# Patient Record
Sex: FEMALE | Race: WHITE | NOT HISPANIC OR LATINO | Employment: FULL TIME | URBAN - NONMETROPOLITAN AREA
[De-identification: names, ages, dates, MRNs, and addresses within clinical notes are randomized per-mention and may not be internally consistent; named-entity substitution may affect disease eponyms.]

---

## 2021-09-06 ENCOUNTER — OFFICE VISIT (OUTPATIENT)
Dept: FAMILY MEDICINE | Facility: CLINIC | Age: 31
End: 2021-09-06
Payer: OTHER GOVERNMENT

## 2021-09-06 VITALS
HEART RATE: 110 BPM | BODY MASS INDEX: 23.92 KG/M2 | TEMPERATURE: 99 F | HEIGHT: 63 IN | WEIGHT: 135 LBS | OXYGEN SATURATION: 100 % | RESPIRATION RATE: 17 BRPM

## 2021-09-06 DIAGNOSIS — Z20.822 LAB TEST NEGATIVE FOR COVID-19 VIRUS: ICD-10-CM

## 2021-09-06 DIAGNOSIS — J02.9 SORE THROAT: Primary | ICD-10-CM

## 2021-09-06 DIAGNOSIS — G44.209 ACUTE NON INTRACTABLE TENSION-TYPE HEADACHE: ICD-10-CM

## 2021-09-06 PROBLEM — G43.909 MIGRAINE: Status: ACTIVE | Noted: 2020-07-24

## 2021-09-06 PROBLEM — J35.01 CHRONIC TONSILLITIS: Status: ACTIVE | Noted: 2020-07-02

## 2021-09-06 LAB
CTP QC/QA: YES
CTP QC/QA: YES
S PYO RRNA THROAT QL PROBE: NEGATIVE
SARS-COV-2 AG RESP QL IA.RAPID: NEGATIVE

## 2021-09-06 PROCEDURE — 99203 PR OFFICE/OUTPT VISIT, NEW, LEVL III, 30-44 MIN: ICD-10-PCS | Mod: ,,, | Performed by: NURSE PRACTITIONER

## 2021-09-06 PROCEDURE — 87880 POCT RAPID STREP A: ICD-10-PCS | Mod: QW,,, | Performed by: NURSE PRACTITIONER

## 2021-09-06 PROCEDURE — 99203 OFFICE O/P NEW LOW 30 MIN: CPT | Mod: ,,, | Performed by: NURSE PRACTITIONER

## 2021-09-06 PROCEDURE — 87426 SARSCOV CORONAVIRUS AG IA: CPT | Mod: QW,,, | Performed by: NURSE PRACTITIONER

## 2021-09-06 PROCEDURE — 87880 STREP A ASSAY W/OPTIC: CPT | Mod: QW,,, | Performed by: NURSE PRACTITIONER

## 2021-09-06 PROCEDURE — 87426 SARS CORONAVIRUS 2 ANTIGEN POCT: ICD-10-PCS | Mod: QW,,, | Performed by: NURSE PRACTITIONER

## 2021-09-06 RX ORDER — ESCITALOPRAM OXALATE 20 MG/1
TABLET ORAL
COMMUNITY
Start: 2020-08-03 | End: 2021-09-30

## 2021-09-06 RX ORDER — CYANOCOBALAMIN (VITAMIN B-12)
POWDER (GRAM) MISCELLANEOUS
COMMUNITY

## 2021-09-06 RX ORDER — ACETAMINOPHEN 500 MG
5000 TABLET ORAL
COMMUNITY

## 2021-09-06 RX ORDER — DEXTROAMPHETAMINE SACCHARATE, AMPHETAMINE ASPARTATE, DEXTROAMPHETAMINE SULFATE AND AMPHETAMINE SULFATE 7.5; 7.5; 7.5; 7.5 MG/1; MG/1; MG/1; MG/1
1 TABLET ORAL DAILY
COMMUNITY
Start: 2021-09-03 | End: 2021-09-30 | Stop reason: SDUPTHER

## 2021-09-06 RX ORDER — CETIRIZINE HYDROCHLORIDE, PSEUDOEPHEDRINE HYDROCHLORIDE 5; 120 MG/1; MG/1
TABLET, FILM COATED, EXTENDED RELEASE ORAL
COMMUNITY
End: 2023-03-29 | Stop reason: SDUPTHER

## 2021-09-06 RX ORDER — TOPIRAMATE 50 MG/1
TABLET, FILM COATED ORAL
COMMUNITY
Start: 2020-10-12 | End: 2023-03-29 | Stop reason: SDUPTHER

## 2021-09-30 ENCOUNTER — OFFICE VISIT (OUTPATIENT)
Dept: FAMILY MEDICINE | Facility: CLINIC | Age: 31
End: 2021-09-30
Payer: OTHER GOVERNMENT

## 2021-09-30 VITALS
TEMPERATURE: 98 F | OXYGEN SATURATION: 99 % | DIASTOLIC BLOOD PRESSURE: 85 MMHG | SYSTOLIC BLOOD PRESSURE: 132 MMHG | BODY MASS INDEX: 23.39 KG/M2 | WEIGHT: 132 LBS | RESPIRATION RATE: 18 BRPM | HEIGHT: 63 IN | HEART RATE: 93 BPM

## 2021-09-30 DIAGNOSIS — F98.8 ADULT ATTENTION DEFICIT DISORDER: Primary | ICD-10-CM

## 2021-09-30 LAB
B-HCG UR QL: NEGATIVE
CTP QC/QA: YES
CTP QC/QA: YES
POC (AMP) AMPHETAMINE: ABNORMAL
POC (BAR) BARBITURATES: NEGATIVE
POC (BUP) BUPRENORPHINE: NEGATIVE
POC (BZO) BENZODIAZEPINES: NEGATIVE
POC (COC) COCAINE: NEGATIVE
POC (MDMA) METHYLENEDIOXYMETHAMPHETAMINE 3,4: NEGATIVE
POC (MET) METHAMPHETAMINE: NEGATIVE
POC (MOP) OPIATES: NEGATIVE
POC (MTD) METHADONE: NEGATIVE
POC (OXY) OXYCODONE: NEGATIVE
POC (PCP) PHENCYCLIDINE: NEGATIVE
POC (TCA) TRICYCLIC ANTIDEPRESSANTS: NEGATIVE
POC TEMPERATURE (URINE): 92
POC THC: NEGATIVE

## 2021-09-30 PROCEDURE — 99213 OFFICE O/P EST LOW 20 MIN: CPT | Mod: ,,, | Performed by: NURSE PRACTITIONER

## 2021-09-30 PROCEDURE — 80305 POCT URINE DRUG SCREEN PRESUMP: ICD-10-PCS | Mod: QW,,, | Performed by: NURSE PRACTITIONER

## 2021-09-30 PROCEDURE — 80305 DRUG TEST PRSMV DIR OPT OBS: CPT | Mod: QW,,, | Performed by: NURSE PRACTITIONER

## 2021-09-30 PROCEDURE — 99213 PR OFFICE/OUTPT VISIT, EST, LEVL III, 20-29 MIN: ICD-10-PCS | Mod: ,,, | Performed by: NURSE PRACTITIONER

## 2021-09-30 RX ORDER — MAGNESIUM 250 MG
TABLET ORAL
COMMUNITY

## 2021-09-30 RX ORDER — ASPRIN AND CAFFEINE 400; 32 MG/1; MG/1
TABLET ORAL
COMMUNITY

## 2021-09-30 RX ORDER — PROMETHAZINE HYDROCHLORIDE 25 MG/1
TABLET ORAL
COMMUNITY
Start: 2021-04-30

## 2021-09-30 RX ORDER — DEXTROAMPHETAMINE SACCHARATE, AMPHETAMINE ASPARTATE, DEXTROAMPHETAMINE SULFATE AND AMPHETAMINE SULFATE 7.5; 7.5; 7.5; 7.5 MG/1; MG/1; MG/1; MG/1
1 TABLET ORAL DAILY
Qty: 30 TABLET | Refills: 0 | Status: SHIPPED | OUTPATIENT
Start: 2021-09-30 | End: 2021-12-29 | Stop reason: SDUPTHER

## 2021-09-30 RX ORDER — SUMATRIPTAN 50 MG/1
TABLET, FILM COATED ORAL
COMMUNITY

## 2021-09-30 RX ORDER — IRON,CARBONYL/ASCORBIC ACID 65MG-125MG
TABLET, DELAYED RELEASE (ENTERIC COATED) ORAL
COMMUNITY

## 2021-09-30 RX ORDER — PYRIDOXINE HCL (VITAMIN B6) 100 MG
TABLET ORAL
COMMUNITY

## 2021-11-23 ENCOUNTER — OFFICE VISIT (OUTPATIENT)
Dept: FAMILY MEDICINE | Facility: CLINIC | Age: 31
End: 2021-11-23
Payer: OTHER GOVERNMENT

## 2021-11-23 VITALS — TEMPERATURE: 99 F

## 2021-11-23 DIAGNOSIS — U07.1 COVID-19: Primary | ICD-10-CM

## 2021-11-23 DIAGNOSIS — Z20.822 CONTACT WITH AND (SUSPECTED) EXPOSURE TO COVID-19: ICD-10-CM

## 2021-11-23 LAB
CTP QC/QA: YES
FLUAV AG NPH QL: NEGATIVE
FLUBV AG NPH QL: NEGATIVE
SARS-COV-2 AG RESP QL IA.RAPID: POSITIVE

## 2021-11-23 PROCEDURE — 87428 SARSCOV & INF VIR A&B AG IA: CPT | Mod: QW,,, | Performed by: NURSE PRACTITIONER

## 2021-11-23 PROCEDURE — 99213 PR OFFICE/OUTPT VISIT, EST, LEVL III, 20-29 MIN: ICD-10-PCS | Mod: ,,, | Performed by: NURSE PRACTITIONER

## 2021-11-23 PROCEDURE — 87428 POCT SARS-COV2 (COVID) WITH FLU ANTIGEN: ICD-10-PCS | Mod: QW,,, | Performed by: NURSE PRACTITIONER

## 2021-11-23 PROCEDURE — 99213 OFFICE O/P EST LOW 20 MIN: CPT | Mod: ,,, | Performed by: NURSE PRACTITIONER

## 2021-11-23 RX ORDER — AZITHROMYCIN 250 MG/1
TABLET, FILM COATED ORAL
Qty: 6 TABLET | Refills: 0 | Status: SHIPPED | OUTPATIENT
Start: 2021-11-23 | End: 2021-12-29

## 2021-11-23 RX ORDER — BENZONATATE 100 MG/1
100 CAPSULE ORAL 3 TIMES DAILY PRN
Qty: 30 CAPSULE | Refills: 0 | Status: SHIPPED | OUTPATIENT
Start: 2021-11-23 | End: 2021-12-29

## 2021-11-24 ENCOUNTER — INFUSION (OUTPATIENT)
Dept: INFECTIOUS DISEASES | Facility: HOSPITAL | Age: 31
End: 2021-11-24
Attending: NURSE PRACTITIONER
Payer: OTHER GOVERNMENT

## 2021-11-24 VITALS
RESPIRATION RATE: 16 BRPM | SYSTOLIC BLOOD PRESSURE: 117 MMHG | HEART RATE: 102 BPM | TEMPERATURE: 98 F | DIASTOLIC BLOOD PRESSURE: 74 MMHG | OXYGEN SATURATION: 99 %

## 2021-11-24 DIAGNOSIS — U07.1 COVID-19: ICD-10-CM

## 2021-11-24 PROCEDURE — 25000003 PHARM REV CODE 250: Performed by: NURSE PRACTITIONER

## 2021-11-24 PROCEDURE — M0243 CASIRIVI AND IMDEVI INFUSION: HCPCS | Performed by: NURSE PRACTITIONER

## 2021-11-24 PROCEDURE — 63600175 PHARM REV CODE 636 W HCPCS: Performed by: NURSE PRACTITIONER

## 2021-11-24 RX ORDER — SODIUM CHLORIDE 0.9 % (FLUSH) 0.9 %
10 SYRINGE (ML) INJECTION
Status: DISCONTINUED | OUTPATIENT
Start: 2021-11-24 | End: 2021-12-29

## 2021-11-24 RX ORDER — EPINEPHRINE 0.3 MG/.3ML
0.3 INJECTION SUBCUTANEOUS
Status: DISCONTINUED | OUTPATIENT
Start: 2021-11-24 | End: 2021-12-29

## 2021-11-24 RX ORDER — ALBUTEROL SULFATE 90 UG/1
2 AEROSOL, METERED RESPIRATORY (INHALATION)
Status: DISCONTINUED | OUTPATIENT
Start: 2021-11-24 | End: 2021-12-29

## 2021-11-24 RX ORDER — DIPHENHYDRAMINE HYDROCHLORIDE 50 MG/ML
25 INJECTION INTRAMUSCULAR; INTRAVENOUS ONCE AS NEEDED
Status: DISCONTINUED | OUTPATIENT
Start: 2021-11-24 | End: 2021-12-29

## 2021-11-24 RX ORDER — ACETAMINOPHEN 325 MG/1
650 TABLET ORAL ONCE AS NEEDED
Status: DISCONTINUED | OUTPATIENT
Start: 2021-11-24 | End: 2021-12-29

## 2021-11-24 RX ORDER — ONDANSETRON 4 MG/1
4 TABLET, ORALLY DISINTEGRATING ORAL ONCE AS NEEDED
Status: DISCONTINUED | OUTPATIENT
Start: 2021-11-24 | End: 2021-12-29

## 2021-11-24 RX ADMIN — CASIRIVIMAB AND IMDEVIMAB 600 MG: 600; 600 INJECTION, SOLUTION, CONCENTRATE INTRAVENOUS at 11:11

## 2021-12-29 ENCOUNTER — OFFICE VISIT (OUTPATIENT)
Dept: FAMILY MEDICINE | Facility: CLINIC | Age: 31
End: 2021-12-29
Payer: OTHER GOVERNMENT

## 2021-12-29 ENCOUNTER — HOSPITAL ENCOUNTER (OUTPATIENT)
Dept: CARDIOLOGY | Facility: HOSPITAL | Age: 31
Discharge: HOME | End: 2021-12-29
Payer: OTHER GOVERNMENT

## 2021-12-29 VITALS
HEIGHT: 63 IN | SYSTOLIC BLOOD PRESSURE: 118 MMHG | BODY MASS INDEX: 23.57 KG/M2 | HEART RATE: 96 BPM | DIASTOLIC BLOOD PRESSURE: 81 MMHG | OXYGEN SATURATION: 99 % | TEMPERATURE: 98 F | WEIGHT: 133 LBS | RESPIRATION RATE: 18 BRPM

## 2021-12-29 DIAGNOSIS — F98.8 ADULT ATTENTION DEFICIT DISORDER: Primary | ICD-10-CM

## 2021-12-29 DIAGNOSIS — J01.00 ACUTE MAXILLARY SINUSITIS, RECURRENCE NOT SPECIFIED: ICD-10-CM

## 2021-12-29 DIAGNOSIS — F98.8 ADULT ATTENTION DEFICIT DISORDER: ICD-10-CM

## 2021-12-29 DIAGNOSIS — H65.01 RIGHT ACUTE SEROUS OTITIS MEDIA, RECURRENCE NOT SPECIFIED: ICD-10-CM

## 2021-12-29 DIAGNOSIS — R94.31 ABNORMAL EKG: ICD-10-CM

## 2021-12-29 DIAGNOSIS — H92.01 ACUTE OTALGIA, RIGHT: ICD-10-CM

## 2021-12-29 PROCEDURE — 80305 DRUG TEST PRSMV DIR OPT OBS: CPT | Mod: QW,,, | Performed by: NURSE PRACTITIONER

## 2021-12-29 PROCEDURE — 93005 ELECTROCARDIOGRAM TRACING: CPT

## 2021-12-29 PROCEDURE — 93000 PR ELECTROCARDIOGRAM, COMPLETE: ICD-10-PCS | Mod: ,,, | Performed by: NURSE PRACTITIONER

## 2021-12-29 PROCEDURE — 80305 POCT URINE DRUG SCREEN PRESUMP: ICD-10-PCS | Mod: QW,,, | Performed by: NURSE PRACTITIONER

## 2021-12-29 PROCEDURE — 99213 PR OFFICE/OUTPT VISIT, EST, LEVL III, 20-29 MIN: ICD-10-PCS | Mod: 25,,, | Performed by: NURSE PRACTITIONER

## 2021-12-29 PROCEDURE — 93010 ELECTROCARDIOGRAM REPORT: CPT | Mod: ,,, | Performed by: INTERNAL MEDICINE

## 2021-12-29 PROCEDURE — 93010 EKG 12-LEAD: ICD-10-PCS | Mod: ,,, | Performed by: INTERNAL MEDICINE

## 2021-12-29 PROCEDURE — 99213 OFFICE O/P EST LOW 20 MIN: CPT | Mod: 25,,, | Performed by: NURSE PRACTITIONER

## 2021-12-29 PROCEDURE — 81025 POCT URINE PREGNANCY: ICD-10-PCS | Mod: QW,,, | Performed by: NURSE PRACTITIONER

## 2021-12-29 PROCEDURE — 81025 URINE PREGNANCY TEST: CPT | Mod: QW,,, | Performed by: NURSE PRACTITIONER

## 2021-12-29 PROCEDURE — 93000 ELECTROCARDIOGRAM COMPLETE: CPT | Mod: ,,, | Performed by: NURSE PRACTITIONER

## 2021-12-29 RX ORDER — SULFAMETHOXAZOLE AND TRIMETHOPRIM 800; 160 MG/1; MG/1
1 TABLET ORAL 2 TIMES DAILY
Qty: 14 TABLET | Refills: 0 | Status: SHIPPED | OUTPATIENT
Start: 2021-12-29 | End: 2022-01-05

## 2021-12-29 RX ORDER — DEXTROAMPHETAMINE SACCHARATE, AMPHETAMINE ASPARTATE, DEXTROAMPHETAMINE SULFATE AND AMPHETAMINE SULFATE 7.5; 7.5; 7.5; 7.5 MG/1; MG/1; MG/1; MG/1
1 TABLET ORAL DAILY
Qty: 30 TABLET | Refills: 0 | Status: SHIPPED | OUTPATIENT
Start: 2021-12-29 | End: 2022-03-29 | Stop reason: SDUPTHER

## 2021-12-29 RX ORDER — FLUTICASONE PROPIONATE 50 MCG
1 SPRAY, SUSPENSION (ML) NASAL DAILY
Qty: 16 G | Refills: 2 | Status: SHIPPED | OUTPATIENT
Start: 2021-12-29

## 2021-12-30 ENCOUNTER — PATIENT MESSAGE (OUTPATIENT)
Dept: FAMILY MEDICINE | Facility: CLINIC | Age: 31
End: 2021-12-30
Payer: OTHER GOVERNMENT

## 2022-01-05 ENCOUNTER — OFFICE VISIT (OUTPATIENT)
Dept: CARDIOLOGY | Facility: CLINIC | Age: 32
End: 2022-01-05
Payer: OTHER GOVERNMENT

## 2022-01-05 VITALS
OXYGEN SATURATION: 97 % | SYSTOLIC BLOOD PRESSURE: 116 MMHG | DIASTOLIC BLOOD PRESSURE: 84 MMHG | HEART RATE: 108 BPM | HEIGHT: 63 IN | BODY MASS INDEX: 23.39 KG/M2 | WEIGHT: 132 LBS

## 2022-01-05 DIAGNOSIS — I95.1 ORTHOSTATIC HYPOTENSION: ICD-10-CM

## 2022-01-05 DIAGNOSIS — G43.709 CHRONIC MIGRAINE WITHOUT AURA WITHOUT STATUS MIGRAINOSUS, NOT INTRACTABLE: ICD-10-CM

## 2022-01-05 DIAGNOSIS — R94.31 ABNORMAL EKG: ICD-10-CM

## 2022-01-05 DIAGNOSIS — F98.8 ADD (ATTENTION DEFICIT DISORDER) WITHOUT HYPERACTIVITY: ICD-10-CM

## 2022-01-05 DIAGNOSIS — R94.31 NONSPECIFIC ABNORMAL ELECTROCARDIOGRAM (ECG) (EKG): Primary | ICD-10-CM

## 2022-01-05 PROCEDURE — 99215 OFFICE O/P EST HI 40 MIN: CPT | Mod: PBBFAC | Performed by: INTERNAL MEDICINE

## 2022-01-05 PROCEDURE — 99205 OFFICE O/P NEW HI 60 MIN: CPT | Mod: S$PBB,,, | Performed by: INTERNAL MEDICINE

## 2022-01-05 PROCEDURE — 99205 PR OFFICE/OUTPT VISIT, NEW, LEVL V, 60-74 MIN: ICD-10-PCS | Mod: S$PBB,,, | Performed by: INTERNAL MEDICINE

## 2022-01-05 NOTE — PROGRESS NOTES
Cardiology Clinic Note:    PCP: Ghassan Beth NP    REFERRING PHYSICIAN: Ghassan Beth NP    CHIEF COMPLAINT:   Chief Complaint   Patient presents with    Shortness of Breath     Pt believes it may be from anxiety    Headache    Palpitations     racing        HISTORY OF PRESENT ILLNESS:  Lauren Mitchell is a 31 y.o. female who presents for evaluation of abnormal EKG.     Pt states had EKG ordered for screening secondary to long term stimulant use for ADHD for 9-10 yrs, which was abnormal.  No problems with medication.  She reports dizziness with standing.  She is active on elliptical machine for 30 min without provocation of chest pain, pressure, tightness or shortness of breath.  Has history of syncope in high school.  Has history of migraines. Denies symptoms of palpitaions.       Review of Systems   Constitutional: Negative for diaphoresis, malaise/fatigue, night sweats and weight gain.   HENT: Negative for congestion, ear pain, hearing loss, nosebleeds and sore throat.    Eyes: Negative for blurred vision, double vision, pain, photophobia and visual disturbance.   Cardiovascular: Positive for palpitations. Negative for chest pain, claudication, dyspnea on exertion, irregular heartbeat, leg swelling, near-syncope, orthopnea and syncope.   Respiratory: Positive for shortness of breath. Negative for cough, sleep disturbances due to breathing, snoring and wheezing.    Endocrine: Negative for cold intolerance, heat intolerance, polydipsia, polyphagia and polyuria.   Hematologic/Lymphatic: Negative for bleeding problem. Does not bruise/bleed easily.   Skin: Negative for dry skin, flushing, itching, rash and skin cancer.   Musculoskeletal: Negative for arthritis, back pain, falls, joint pain, muscle cramps, muscle weakness and myalgias.   Gastrointestinal: Negative for abdominal pain, change in bowel habit, constipation, diarrhea, dysphagia, heartburn, nausea and vomiting.   Genitourinary: Negative for bladder  incontinence, dysuria, flank pain, frequency and nocturia.   Neurological: Positive for dizziness and headaches. Negative for focal weakness, light-headedness, loss of balance, numbness, paresthesias and seizures.   Psychiatric/Behavioral: Positive for depression. Negative for memory loss and substance abuse. The patient is nervous/anxious.    Allergic/Immunologic: Positive for environmental allergies.        PAST MEDICAL HISTORY:  History reviewed. No pertinent past medical history.    PAST SURGICAL HISTORY:  History reviewed. No pertinent surgical history.    SOCIAL HISTORY:  Social History     Socioeconomic History    Marital status:    Tobacco Use    Smoking status: Never Smoker    Smokeless tobacco: Never Used   Substance and Sexual Activity    Alcohol use: Yes     Comment: occ    Drug use: Never    Sexual activity: Yes       FAMILY HISTORY:  History reviewed. No pertinent family history.    ALLERGIES:  Allergies as of 01/05/2022 - Reviewed 01/05/2022   Allergen Reaction Noted    Bupropion Other (See Comments) 08/29/2019    Doxycycline Other (See Comments) 08/29/2019    Latex Other (See Comments) 08/29/2019    Penicillins Rash 12/06/2012         MEDICATIONS:  Current Outpatient Medications on File Prior to Visit   Medication Sig Dispense Refill    aspirin-caffeine (ANACIN) 400-32 mg Tab       cholecalciferol, vitamin D3, 125 mcg (5,000 unit) Tab Take 5,000 Units by mouth.      cyanocobalamin, vitamin B-12, (CYANOCOBALAMIN,VIT B-12,,BULK,) Powd Take by mouth.      dextroamphetamine-amphetamine 30 mg Tab Take 1 tablet (30 mg total) by mouth once daily. 30 tablet 0    fluticasone propionate (FLONASE) 50 mcg/actuation nasal spray 1 spray (50 mcg total) by Each Nostril route once daily. 16 g 2    iron,carbonyl-vitamin C (VITRON-C) 65 mg iron- 125 mg TbEC       magnesium 250 mg Tab       promethazine (PHENERGAN) 25 MG tablet       pyridoxine, vitamin B6, (B-6) 100 MG Tab        "sulfamethoxazole-trimethoprim 800-160mg (BACTRIM DS) 800-160 mg Tab Take 1 tablet by mouth 2 (two) times daily. for 7 days 14 tablet 0    sumatriptan (IMITREX) 50 MG tablet       topiramate (TOPAMAX) 50 MG tablet       cetirizine-pseudoephedrine 5-120 mg Tb12 Take by mouth.       No current facility-administered medications on file prior to visit.          PHYSICAL EXAM:  Blood pressure 116/84, pulse 108, height 5' 3" (1.6 m), weight 59.9 kg (132 lb), SpO2 97 %.  Wt Readings from Last 3 Encounters:   01/05/22 59.9 kg (132 lb)   12/29/21 60.3 kg (133 lb)   09/30/21 59.9 kg (132 lb)      Body mass index is 23.38 kg/m².    Physical Exam  Vitals and nursing note reviewed.   Constitutional:       Appearance: Normal appearance. She is normal weight.   HENT:      Head: Normocephalic and atraumatic.      Right Ear: External ear normal.      Left Ear: External ear normal.   Eyes:      General: No scleral icterus.        Right eye: No discharge.         Left eye: No discharge.      Extraocular Movements: Extraocular movements intact.      Conjunctiva/sclera: Conjunctivae normal.      Pupils: Pupils are equal, round, and reactive to light.   Cardiovascular:      Rate and Rhythm: Normal rate and regular rhythm.      Pulses: Normal pulses.      Heart sounds: Normal heart sounds. No murmur heard.  No friction rub. No gallop.    Pulmonary:      Effort: Pulmonary effort is normal.      Breath sounds: Normal breath sounds. No wheezing, rhonchi or rales.   Chest:      Chest wall: No tenderness.   Abdominal:      General: Abdomen is flat. Bowel sounds are normal. There is no distension.      Palpations: Abdomen is soft.      Tenderness: There is no abdominal tenderness. There is no guarding or rebound.   Musculoskeletal:         General: No swelling or tenderness. Normal range of motion.      Cervical back: Normal range of motion and neck supple.   Skin:     General: Skin is warm and dry.      Findings: No erythema or rash. "   Neurological:      General: No focal deficit present.      Mental Status: She is alert and oriented to person, place, and time.      Cranial Nerves: No cranial nerve deficit.      Motor: No weakness.      Gait: Gait normal.   Psychiatric:         Mood and Affect: Mood normal.         Behavior: Behavior normal.         Thought Content: Thought content normal.         Judgment: Judgment normal.        LABS REVIEWED:  No results found for: WBC, RBC, HGB, HCT, MCV, MCH, MCHC, RDW, PLT, MPV, NRBC, INR  No results found for: NA, K, CL, CO2, BUN, MG, PHOS  No results found for: CPK, AST, ALT  No results found for: GLU, HGBA1C  No results found for: CHOL, HDL, TRIG, CHOLHDL    CARDIAC STUDIES REVIEWED:  EKG  12/29/21 -   Normal sinus rhythm.   Rightward axis.  Septal infarct ,age undetermined     OTHER IMAGING STUDIES REVIEWED:    ASSESSMENT:   Abnormal EKG  -     Ambulatory referral/consult to Cardiology      PLAN:  1.  Orthostatic hypotension, management with careful positional changes.   2.  ADHD, on Adderall x 9-10 yrs, takes daily.    3.  Migraines - controlled with Topamax.   4.  Abnormal EKG -- question of old septal infarct, suspect normal variant. Will obtain echo to evaluate function.

## 2022-01-09 PROBLEM — F98.8 ADD (ATTENTION DEFICIT DISORDER) WITHOUT HYPERACTIVITY: Status: ACTIVE | Noted: 2022-01-09

## 2022-01-09 PROBLEM — I95.1 ORTHOSTATIC HYPOTENSION: Status: ACTIVE | Noted: 2022-01-09

## 2022-01-09 PROBLEM — R94.31 NONSPECIFIC ABNORMAL ELECTROCARDIOGRAM (ECG) (EKG): Status: ACTIVE | Noted: 2022-01-09

## 2022-01-11 ENCOUNTER — HOSPITAL ENCOUNTER (OUTPATIENT)
Dept: CARDIOLOGY | Facility: HOSPITAL | Age: 32
Discharge: HOME | End: 2022-01-11
Attending: INTERNAL MEDICINE
Payer: OTHER GOVERNMENT

## 2022-01-11 DIAGNOSIS — R94.31 ABNORMAL EKG: ICD-10-CM

## 2022-01-11 LAB
AORTIC ROOT ANNULUS: 2.2 CM
AORTIC VALVE CUSP SEPERATION: 17.3 CM
AV INDEX (PROSTH): 0.84
AV MEAN GRADIENT: 5 MMHG
AV PEAK GRADIENT: 9 MMHG
AV VALVE AREA: 2.14 CM2
AV VELOCITY RATIO: 0.73
CV ECHO LV RWT: 0.31 CM
DOP CALC AO PEAK VEL: 1.5 M/S
DOP CALC AO VTI: 25 CM
DOP CALC LVOT AREA: 2.5 CM2
DOP CALC LVOT DIAMETER: 1.8 CM
DOP CALC LVOT PEAK VEL: 1.1 M/S
DOP CALC LVOT STROKE VOLUME: 53.41 CM3
DOP CALCLVOT PEAK VEL VTI: 21 CM
E WAVE DECELERATION TIME: 150 MSEC
ECHO EF ESTIMATED: 55 %
ECHO LV POSTERIOR WALL: 0.64 CM (ref 0.6–1.1)
EJECTION FRACTION: 55 %
FRACTIONAL SHORTENING: 28 % (ref 28–44)
INTERVENTRICULAR SEPTUM: 0.58 CM (ref 0.6–1.1)
IVC OSTIUM: 1 CM
LEFT ATRIUM SIZE: 2.2 CM
LEFT INTERNAL DIMENSION IN SYSTOLE: 2.96 CM (ref 2.1–4)
LEFT VENTRICLE DIASTOLIC VOLUME: 73.8 ML
LEFT VENTRICLE SYSTOLIC VOLUME: 33.9 ML
LEFT VENTRICULAR INTERNAL DIMENSION IN DIASTOLE: 4.09 CM (ref 3.5–6)
LEFT VENTRICULAR MASS: 68.24 G
LVOT MG: 3 MMHG
MV PEAK E VEL: 0.95 M/S
PISA TR MAX VEL: 2.7 M/S
RA MAJOR: 2.1 CM
RA PRESSURE: 3 MMHG
RIGHT VENTRICULAR END-DIASTOLIC DIMENSION: 2 CM
TR MAX PG: 29 MMHG
TRICUSPID ANNULAR PLANE SYSTOLIC EXCURSION: 2 CM
TV REST PULMONARY ARTERY PRESSURE: 32 MMHG

## 2022-01-11 PROCEDURE — 93306 TTE W/DOPPLER COMPLETE: CPT

## 2022-01-11 PROCEDURE — 93306 TTE W/DOPPLER COMPLETE: CPT | Mod: 26,,, | Performed by: INTERNAL MEDICINE

## 2022-01-11 PROCEDURE — 93306 ECHO (CUPID ONLY): ICD-10-PCS | Mod: 26,,, | Performed by: INTERNAL MEDICINE

## 2022-02-16 NOTE — PROGRESS NOTES
Cardiology Clinic Note:    PCP: Ghassan Beth NP    REFERRING PHYSICIAN: Ghassan Beth NP    CHIEF COMPLAINT:   Chief Complaint   Patient presents with    Dizziness    Tachycardia        HISTORY OF PRESENT ILLNESS:  Lauren Mitchell is a 31 y.o. female who presents for evaluation of palpitations.     Patient states she has had an episode of dizziness and palpitations after getting out of bath, relieved with sitting.  She has not had regular exercise.   She denies chest pain, pressure, tightness or shortness of breath.     Pt states she has headaches daily.  Is followed by neurology in [place].       Review of Systems   Constitutional: Negative for diaphoresis, malaise/fatigue, night sweats and weight gain.   HENT: Negative for congestion, ear pain, hearing loss, nosebleeds and sore throat.    Eyes: Negative for blurred vision, double vision, pain, photophobia and visual disturbance.   Cardiovascular: Positive for palpitations. Negative for chest pain, claudication, dyspnea on exertion, irregular heartbeat, leg swelling, near-syncope, orthopnea and syncope.   Respiratory: Positive for shortness of breath. Negative for cough, sleep disturbances due to breathing, snoring and wheezing.    Endocrine: Negative for cold intolerance, heat intolerance, polydipsia, polyphagia and polyuria.   Hematologic/Lymphatic: Negative for bleeding problem. Does not bruise/bleed easily.   Skin: Negative for dry skin, flushing, itching, rash and skin cancer.   Musculoskeletal: Negative for arthritis, back pain, falls, joint pain, muscle cramps, muscle weakness and myalgias.   Gastrointestinal: Negative for abdominal pain, change in bowel habit, constipation, diarrhea, dysphagia, heartburn, nausea and vomiting.   Genitourinary: Negative for bladder incontinence, dysuria, flank pain, frequency and nocturia.   Neurological: Positive for dizziness and headaches. Negative for focal weakness, light-headedness, loss of balance, numbness,  paresthesias and seizures.   Psychiatric/Behavioral: Positive for depression. Negative for memory loss and substance abuse. The patient is nervous/anxious.    Allergic/Immunologic: Positive for environmental allergies.        PAST MEDICAL HISTORY:  History reviewed. No pertinent past medical history.    PAST SURGICAL HISTORY:  History reviewed. No pertinent surgical history.    SOCIAL HISTORY:  Social History     Socioeconomic History    Marital status:    Tobacco Use    Smoking status: Never Smoker    Smokeless tobacco: Never Used   Substance and Sexual Activity    Alcohol use: Yes     Comment: occ    Drug use: Never    Sexual activity: Yes       FAMILY HISTORY:  Family History   Problem Relation Age of Onset    Diabetes Father     Mitral valve prolapse Maternal Uncle        ALLERGIES:  Allergies as of 02/17/2022 - Reviewed 02/17/2022   Allergen Reaction Noted    Bupropion Other (See Comments) 08/29/2019    Doxycycline Other (See Comments) 08/29/2019    Latex Other (See Comments) 08/29/2019    Penicillins Rash 12/06/2012         MEDICATIONS:  Current Outpatient Medications on File Prior to Visit   Medication Sig Dispense Refill    aspirin-caffeine (ANACIN) 400-32 mg Tab       cetirizine-pseudoephedrine 5-120 mg Tb12 Take by mouth.      cholecalciferol, vitamin D3, 125 mcg (5,000 unit) Tab Take 5,000 Units by mouth.      cyanocobalamin, vitamin B-12, (CYANOCOBALAMIN,VIT B-12,,BULK,) Powd Take by mouth.      dextroamphetamine-amphetamine 30 mg Tab Take 1 tablet (30 mg total) by mouth once daily. 30 tablet 0    fluticasone propionate (FLONASE) 50 mcg/actuation nasal spray 1 spray (50 mcg total) by Each Nostril route once daily. 16 g 2    iron,carbonyl-vitamin C (VITRON-C) 65 mg iron- 125 mg TbEC       magnesium 250 mg Tab       promethazine (PHENERGAN) 25 MG tablet       pyridoxine, vitamin B6, (B-6) 100 MG Tab       sumatriptan (IMITREX) 50 MG tablet       topiramate (TOPAMAX) 50 MG  "tablet        No current facility-administered medications on file prior to visit.          PHYSICAL EXAM:  Blood pressure 120/80, pulse (!) 122, height 5' 3" (1.6 m), weight 58.1 kg (128 lb), SpO2 99 %.  Wt Readings from Last 3 Encounters:   02/17/22 58.1 kg (128 lb)   01/05/22 59.9 kg (132 lb)   12/29/21 60.3 kg (133 lb)      Body mass index is 22.67 kg/m².    Physical Exam  Vitals and nursing note reviewed.   Constitutional:       Appearance: Normal appearance. She is normal weight.   HENT:      Head: Normocephalic and atraumatic.      Right Ear: External ear normal.      Left Ear: External ear normal.   Eyes:      General: No scleral icterus.        Right eye: No discharge.         Left eye: No discharge.      Extraocular Movements: Extraocular movements intact.      Conjunctiva/sclera: Conjunctivae normal.      Pupils: Pupils are equal, round, and reactive to light.   Cardiovascular:      Rate and Rhythm: Normal rate and regular rhythm.      Pulses: Normal pulses.      Heart sounds: Normal heart sounds. No murmur heard.  No friction rub. No gallop.    Pulmonary:      Effort: Pulmonary effort is normal.      Breath sounds: Normal breath sounds. No wheezing, rhonchi or rales.   Chest:      Chest wall: No tenderness.   Abdominal:      General: Abdomen is flat. Bowel sounds are normal. There is no distension.      Palpations: Abdomen is soft.      Tenderness: There is no abdominal tenderness. There is no guarding or rebound.   Musculoskeletal:         General: No swelling or tenderness. Normal range of motion.      Cervical back: Normal range of motion and neck supple.   Skin:     General: Skin is warm and dry.      Findings: No erythema or rash.   Neurological:      General: No focal deficit present.      Mental Status: She is alert and oriented to person, place, and time.      Cranial Nerves: No cranial nerve deficit.      Motor: No weakness.      Gait: Gait normal.   Psychiatric:         Mood and Affect: Mood " normal.         Behavior: Behavior normal.         Thought Content: Thought content normal.         Judgment: Judgment normal.        LABS REVIEWED:  No results found for: WBC, RBC, HGB, HCT, MCV, MCH, MCHC, RDW, PLT, MPV, NRBC, INR  No results found for: NA, K, CL, CO2, BUN, MG, PHOS  No results found for: CPK, AST, ALT  No results found for: GLU, HGBA1C  No results found for: CHOL, HDL, TRIG, CHOLHDL    CARDIAC STUDIES REVIEWED:  EKG  12/29/21 -   Normal sinus rhythm.  Rightward axis.  Septal infarct ,age undetermined   ECHO /1/11/22 -  The left ventricle is normal in size with normal systolic function.   EF 55%    OTHER IMAGING STUDIES REVIEWED:    ASSESSMENT: PLAN:    1.  Orthostatic hypotension, management with careful positional changes.  Walk 30 min daily, 5 days weekly.   2.  ADHD, on Adderall x 9-10 yrs, takes daily.    3.  Migraines - on Topamax, followed by neurology in [place].  Keep diary of headaches.

## 2022-02-17 ENCOUNTER — OFFICE VISIT (OUTPATIENT)
Dept: CARDIOLOGY | Facility: CLINIC | Age: 32
End: 2022-02-17
Payer: OTHER GOVERNMENT

## 2022-02-17 VITALS
HEART RATE: 122 BPM | BODY MASS INDEX: 22.68 KG/M2 | OXYGEN SATURATION: 99 % | HEIGHT: 63 IN | SYSTOLIC BLOOD PRESSURE: 120 MMHG | DIASTOLIC BLOOD PRESSURE: 80 MMHG | WEIGHT: 128 LBS

## 2022-02-17 DIAGNOSIS — F90.2 ADHD (ATTENTION DEFICIT HYPERACTIVITY DISORDER), COMBINED TYPE: Primary | ICD-10-CM

## 2022-02-17 DIAGNOSIS — I95.1 ORTHOSTATIC HYPOTENSION: ICD-10-CM

## 2022-02-17 PROCEDURE — 99214 PR OFFICE/OUTPT VISIT, EST, LEVL IV, 30-39 MIN: ICD-10-PCS | Mod: S$PBB,,, | Performed by: INTERNAL MEDICINE

## 2022-02-17 PROCEDURE — 99215 OFFICE O/P EST HI 40 MIN: CPT | Mod: PBBFAC | Performed by: INTERNAL MEDICINE

## 2022-02-17 PROCEDURE — 99214 OFFICE O/P EST MOD 30 MIN: CPT | Mod: S$PBB,,, | Performed by: INTERNAL MEDICINE

## 2022-02-17 NOTE — PATIENT INSTRUCTIONS
Walk 30 min daily, 5 days weekly or Nordic machine 10 min in morning and 10 min in evening, increase as tolerated.  Keep diary of headaches.

## 2022-03-29 ENCOUNTER — OFFICE VISIT (OUTPATIENT)
Dept: FAMILY MEDICINE | Facility: CLINIC | Age: 32
End: 2022-03-29
Payer: OTHER GOVERNMENT

## 2022-03-29 VITALS
OXYGEN SATURATION: 99 % | SYSTOLIC BLOOD PRESSURE: 133 MMHG | RESPIRATION RATE: 18 BRPM | HEIGHT: 63 IN | DIASTOLIC BLOOD PRESSURE: 83 MMHG | HEART RATE: 82 BPM | BODY MASS INDEX: 23.04 KG/M2 | WEIGHT: 130 LBS | TEMPERATURE: 97 F

## 2022-03-29 DIAGNOSIS — Z79.899 ENCOUNTER FOR LONG-TERM (CURRENT) DRUG USE: ICD-10-CM

## 2022-03-29 DIAGNOSIS — F98.8 ADULT ATTENTION DEFICIT DISORDER: Primary | ICD-10-CM

## 2022-03-29 DIAGNOSIS — J30.1 ALLERGIC RHINITIS DUE TO POLLEN, UNSPECIFIED SEASONALITY: ICD-10-CM

## 2022-03-29 DIAGNOSIS — Z79.899 OTHER LONG TERM (CURRENT) DRUG THERAPY: ICD-10-CM

## 2022-03-29 LAB
B-HCG UR QL: NEGATIVE
CTP QC/QA: YES
CTP QC/QA: YES
POC (AMP) AMPHETAMINE: ABNORMAL
POC (BAR) BARBITURATES: NEGATIVE
POC (BUP) BUPRENORPHINE: NEGATIVE
POC (BZO) BENZODIAZEPINES: NEGATIVE
POC (COC) COCAINE: NEGATIVE
POC (MDMA) METHYLENEDIOXYMETHAMPHETAMINE 3,4: NEGATIVE
POC (MET) METHAMPHETAMINE: NEGATIVE
POC (MOP) OPIATES: NEGATIVE
POC (MTD) METHADONE: NEGATIVE
POC (OXY) OXYCODONE: NEGATIVE
POC (PCP) PHENCYCLIDINE: NEGATIVE
POC (TCA) TRICYCLIC ANTIDEPRESSANTS: NEGATIVE
POC TEMPERATURE (URINE): 94
POC THC: NEGATIVE

## 2022-03-29 PROCEDURE — 81025 URINE PREGNANCY TEST: CPT | Mod: QW,,, | Performed by: NURSE PRACTITIONER

## 2022-03-29 PROCEDURE — 99213 OFFICE O/P EST LOW 20 MIN: CPT | Mod: ,,, | Performed by: NURSE PRACTITIONER

## 2022-03-29 PROCEDURE — 80305 DRUG TEST PRSMV DIR OPT OBS: CPT | Mod: QW,,, | Performed by: NURSE PRACTITIONER

## 2022-03-29 PROCEDURE — 99213 PR OFFICE/OUTPT VISIT, EST, LEVL III, 20-29 MIN: ICD-10-PCS | Mod: ,,, | Performed by: NURSE PRACTITIONER

## 2022-03-29 PROCEDURE — 81025 POCT URINE PREGNANCY: ICD-10-PCS | Mod: QW,,, | Performed by: NURSE PRACTITIONER

## 2022-03-29 PROCEDURE — 80305 POCT URINE DRUG SCREEN PRESUMP: ICD-10-PCS | Mod: QW,,, | Performed by: NURSE PRACTITIONER

## 2022-03-29 RX ORDER — METHYLPREDNISOLONE 4 MG/1
TABLET ORAL
Qty: 21 EACH | Refills: 0 | Status: SHIPPED | OUTPATIENT
Start: 2022-03-29 | End: 2022-04-19

## 2022-03-29 RX ORDER — DEXTROAMPHETAMINE SACCHARATE, AMPHETAMINE ASPARTATE, DEXTROAMPHETAMINE SULFATE AND AMPHETAMINE SULFATE 7.5; 7.5; 7.5; 7.5 MG/1; MG/1; MG/1; MG/1
1 TABLET ORAL DAILY
Qty: 30 TABLET | Refills: 0 | Status: SHIPPED | OUTPATIENT
Start: 2022-03-29 | End: 2022-06-29 | Stop reason: SDUPTHER

## 2022-03-29 NOTE — PROGRESS NOTES
Subjective:       Patient ID: Lauren Mitchell is a 32 y.o. female.    Chief Complaint: ADHD (3 month visit for med refills) and Nasal Congestion (Pressure behind eyes x 5 days)    Ms. Mitchell presents to clinic in follow up for adult attention deficit disorder,  and UDS reviewed and appropriate. She has been on a stable dose of medication for many years. Cardiology records reviewed due to abnormal EKG - echocardiogram reviewed and this was felt to be a normal variant.    The patient complains of nasal congestion and headaches with post nasal drip onset 5 days ago. She reports she completed clindamycin for an abcessed tooth two days ago without any improvement in symptoms.    Review of Systems   Constitutional: Negative for activity change and unexpected weight change.   HENT: Negative for hearing loss, rhinorrhea and trouble swallowing.    Eyes: Negative for discharge and visual disturbance.   Respiratory: Negative for chest tightness and wheezing.    Cardiovascular: Negative for chest pain and palpitations.   Gastrointestinal: Negative for blood in stool, constipation, diarrhea and vomiting.   Endocrine: Negative for polydipsia and polyuria.   Genitourinary: Negative for difficulty urinating, dysuria, hematuria and menstrual problem.   Musculoskeletal: Negative for arthralgias, joint swelling and neck pain.   Neurological: Negative for headaches.   Psychiatric/Behavioral: Negative for confusion and dysphoric mood.         Objective:      Physical Exam  Vitals and nursing note reviewed.   Constitutional:       Appearance: Normal appearance.   HENT:      Head: Normocephalic.      Right Ear: Tympanic membrane normal.      Left Ear: Tympanic membrane normal.      Nose: Congestion present.      Right Sinus: Maxillary sinus tenderness present.      Left Sinus: Maxillary sinus tenderness present.      Mouth/Throat:      Mouth: Mucous membranes are moist.      Pharynx: No posterior oropharyngeal erythema.   Eyes:       Pupils: Pupils are equal, round, and reactive to light.   Cardiovascular:      Rate and Rhythm: Normal rate and regular rhythm.      Pulses: Normal pulses.      Heart sounds: Normal heart sounds.   Pulmonary:      Effort: Pulmonary effort is normal.      Breath sounds: Normal breath sounds.   Abdominal:      General: Bowel sounds are normal.      Palpations: Abdomen is soft.   Musculoskeletal:         General: Normal range of motion.      Cervical back: Normal range of motion and neck supple.   Skin:     General: Skin is warm and dry.   Neurological:      Mental Status: She is alert and oriented to person, place, and time.   Psychiatric:         Behavior: Behavior normal.         Assessment:       Problem List Items Addressed This Visit    None     Visit Diagnoses     Adult attention deficit disorder    -  Primary    Relevant Medications    dextroamphetamine-amphetamine 30 mg Tab    Other Relevant Orders    POCT Urine Drug Screen Presump (Completed)    Encounter for long-term (current) drug use        Relevant Orders    POCT urine pregnancy (Completed)    POCT Urine Drug Screen Presump (Completed)    Other long term (current) drug therapy        Relevant Orders    POCT urine pregnancy (Completed)    Allergic rhinitis due to pollen, unspecified seasonality              Plan:           1. Medrol dose pack for allergic rhinitis and congestion, continue flonase and claritin as needed. Instructed patient to try nasal flushes.  2.  and UDS appropriate (UDS pos amphetamines), RF meds X 3 months with post dates.  Reviewed by ROBERTO Walton MD 4/1/22

## 2022-06-29 ENCOUNTER — OFFICE VISIT (OUTPATIENT)
Dept: FAMILY MEDICINE | Facility: CLINIC | Age: 32
End: 2022-06-29
Payer: OTHER GOVERNMENT

## 2022-06-29 VITALS
TEMPERATURE: 99 F | DIASTOLIC BLOOD PRESSURE: 79 MMHG | WEIGHT: 132 LBS | OXYGEN SATURATION: 99 % | SYSTOLIC BLOOD PRESSURE: 114 MMHG | HEART RATE: 97 BPM | RESPIRATION RATE: 18 BRPM | HEIGHT: 63 IN | BODY MASS INDEX: 23.39 KG/M2

## 2022-06-29 DIAGNOSIS — G43.709 CHRONIC MIGRAINE WITHOUT AURA WITHOUT STATUS MIGRAINOSUS, NOT INTRACTABLE: ICD-10-CM

## 2022-06-29 DIAGNOSIS — Z79.899 ENCOUNTER FOR LONG-TERM (CURRENT) DRUG USE: ICD-10-CM

## 2022-06-29 DIAGNOSIS — F98.8 ADULT ATTENTION DEFICIT DISORDER: Primary | ICD-10-CM

## 2022-06-29 PROCEDURE — 99213 PR OFFICE/OUTPT VISIT, EST, LEVL III, 20-29 MIN: ICD-10-PCS | Mod: ,,, | Performed by: NURSE PRACTITIONER

## 2022-06-29 PROCEDURE — 80305 DRUG TEST PRSMV DIR OPT OBS: CPT | Mod: QW,,, | Performed by: NURSE PRACTITIONER

## 2022-06-29 PROCEDURE — 81025 URINE PREGNANCY TEST: CPT | Mod: QW,,, | Performed by: NURSE PRACTITIONER

## 2022-06-29 PROCEDURE — 99213 OFFICE O/P EST LOW 20 MIN: CPT | Mod: ,,, | Performed by: NURSE PRACTITIONER

## 2022-06-29 PROCEDURE — 81025 POCT URINE PREGNANCY: ICD-10-PCS | Mod: QW,,, | Performed by: NURSE PRACTITIONER

## 2022-06-29 PROCEDURE — 80305 POCT URINE DRUG SCREEN PRESUMP: ICD-10-PCS | Mod: QW,,, | Performed by: NURSE PRACTITIONER

## 2022-06-29 RX ORDER — RIMEGEPANT SULFATE 75 MG/75MG
75 TABLET, ORALLY DISINTEGRATING ORAL ONCE AS NEEDED
Qty: 30 TABLET | Refills: 2 | Status: SHIPPED | OUTPATIENT
Start: 2022-06-29 | End: 2022-06-29

## 2022-06-29 RX ORDER — DEXTROAMPHETAMINE SACCHARATE, AMPHETAMINE ASPARTATE, DEXTROAMPHETAMINE SULFATE AND AMPHETAMINE SULFATE 7.5; 7.5; 7.5; 7.5 MG/1; MG/1; MG/1; MG/1
30 TABLET ORAL EVERY MORNING
Qty: 30 TABLET | Refills: 0 | Status: SHIPPED | OUTPATIENT
Start: 2022-07-29 | End: 2022-09-29 | Stop reason: SDUPTHER

## 2022-06-29 RX ORDER — DEXTROAMPHETAMINE SACCHARATE, AMPHETAMINE ASPARTATE, DEXTROAMPHETAMINE SULFATE AND AMPHETAMINE SULFATE 7.5; 7.5; 7.5; 7.5 MG/1; MG/1; MG/1; MG/1
1 TABLET ORAL DAILY
Qty: 30 TABLET | Refills: 0 | Status: CANCELLED | OUTPATIENT
Start: 2022-06-29

## 2022-06-29 RX ORDER — DEXTROAMPHETAMINE SACCHARATE, AMPHETAMINE ASPARTATE, DEXTROAMPHETAMINE SULFATE AND AMPHETAMINE SULFATE 7.5; 7.5; 7.5; 7.5 MG/1; MG/1; MG/1; MG/1
30 TABLET ORAL EVERY MORNING
Qty: 30 TABLET | Refills: 0 | Status: SHIPPED | OUTPATIENT
Start: 2022-08-29 | End: 2022-09-29 | Stop reason: SDUPTHER

## 2022-06-29 RX ORDER — DEXTROAMPHETAMINE SACCHARATE, AMPHETAMINE ASPARTATE, DEXTROAMPHETAMINE SULFATE AND AMPHETAMINE SULFATE 7.5; 7.5; 7.5; 7.5 MG/1; MG/1; MG/1; MG/1
1 TABLET ORAL DAILY
Qty: 30 TABLET | Refills: 0 | Status: SHIPPED | OUTPATIENT
Start: 2022-06-29 | End: 2022-09-29 | Stop reason: SDUPTHER

## 2022-06-29 NOTE — PROGRESS NOTES
"Subjective:       Patient ID: Lauren Mitchell is a 32 y.o. female.    Chief Complaint: Medication Refill (3 month)    Mrs. Mitchell presents to clinic today for ADHD medication refill and follow-up. Patient reports compliance with medication without adverse side effects or complications. Patient denies palpitations, weight loss, and/or agitation. Reports frequent migraines currently on Topamax and imitrex with phenergan for acute migraine attacks. Reports has migraines every day without aura, reports does not take imitrex and phenergan often due to side effects of drowsiness. States Topamax causes her to feel "foggy" and reports difficult in speech including word recall.    Medication Refill  Associated symptoms include headaches. Pertinent negatives include no abdominal pain, arthralgias, chest pain, joint swelling, nausea, neck pain, vomiting or weakness.     Review of Systems   Constitutional: Negative.  Negative for activity change and unexpected weight change.   HENT: Negative for hearing loss, rhinorrhea and trouble swallowing.    Eyes: Negative for discharge and visual disturbance.   Respiratory: Negative.  Negative for chest tightness and wheezing.    Cardiovascular: Negative.  Negative for chest pain and palpitations.   Gastrointestinal: Negative for abdominal pain, blood in stool, constipation, diarrhea, nausea and vomiting.   Endocrine: Negative for polydipsia and polyuria.   Genitourinary: Negative for difficulty urinating, dysuria, hematuria and menstrual problem.   Musculoskeletal: Negative for arthralgias, joint swelling and neck pain.   Neurological: Positive for speech difficulty and headaches. Negative for dizziness, syncope, weakness, disturbances in coordination, memory loss and coordination difficulties.   Psychiatric/Behavioral: Positive for decreased concentration. Negative for agitation, behavioral problems, confusion, dysphoric mood, hallucinations and sleep disturbance. The patient is not " nervous/anxious and is not hyperactive.          Objective:      Physical Exam  Vitals and nursing note reviewed.   Constitutional:       Appearance: Normal appearance.   Eyes:      Pupils: Pupils are equal, round, and reactive to light.   Cardiovascular:      Rate and Rhythm: Normal rate and regular rhythm.   Pulmonary:      Effort: Pulmonary effort is normal.      Breath sounds: Normal breath sounds.   Abdominal:      Palpations: Abdomen is soft.   Musculoskeletal:      Cervical back: Normal range of motion.   Skin:     General: Skin is warm and dry.      Capillary Refill: Capillary refill takes less than 2 seconds.   Neurological:      General: No focal deficit present.      Mental Status: She is alert and oriented to person, place, and time.   Psychiatric:         Mood and Affect: Mood normal.         Behavior: Behavior normal.         Thought Content: Thought content normal.         Judgment: Judgment normal.         Assessment:       Problem List Items Addressed This Visit        Neuro    Migraine      Other Visit Diagnoses     Adult attention deficit disorder    -  Primary    Relevant Medications    dextroamphetamine-amphetamine 30 mg Tab    Encounter for long-term (current) drug use        Relevant Orders    POCT Urine Drug Screen Presump (Completed)    POCT urine pregnancy (Completed)          Plan:    1. UDS + for amphetamines reviewed.  reviewed. ADHD medication refilled and discussed with patient   2. Sample Nurtec ODT given to patient discussed to take [id number] for preventative migraine treatment and for acute migraines at onset for prophylaxis care. Discussed decreasing Topamax dosage after a few weeks, weaning instructions given. Neurology evaluation done in the distant past for migraine diagnosis.   RTC in 3 month follow-up, call with any concerns, questions, or adverse reactions to medications.

## 2022-09-29 ENCOUNTER — OFFICE VISIT (OUTPATIENT)
Dept: FAMILY MEDICINE | Facility: CLINIC | Age: 32
End: 2022-09-29
Payer: OTHER GOVERNMENT

## 2022-09-29 VITALS
SYSTOLIC BLOOD PRESSURE: 122 MMHG | DIASTOLIC BLOOD PRESSURE: 78 MMHG | RESPIRATION RATE: 16 BRPM | OXYGEN SATURATION: 99 % | TEMPERATURE: 98 F | HEIGHT: 63 IN | BODY MASS INDEX: 23.04 KG/M2 | HEART RATE: 75 BPM | WEIGHT: 130 LBS

## 2022-09-29 DIAGNOSIS — F98.8 ADULT ATTENTION DEFICIT DISORDER: Primary | ICD-10-CM

## 2022-09-29 DIAGNOSIS — G43.709 CHRONIC MIGRAINE WITHOUT AURA WITHOUT STATUS MIGRAINOSUS, NOT INTRACTABLE: ICD-10-CM

## 2022-09-29 DIAGNOSIS — Z79.899 LONG-TERM USE OF HIGH-RISK MEDICATION: ICD-10-CM

## 2022-09-29 PROCEDURE — 81025 POCT URINE PREGNANCY: ICD-10-PCS | Mod: QW,,, | Performed by: NURSE PRACTITIONER

## 2022-09-29 PROCEDURE — 80305 DRUG TEST PRSMV DIR OPT OBS: CPT | Mod: QW,,, | Performed by: NURSE PRACTITIONER

## 2022-09-29 PROCEDURE — 99213 PR OFFICE/OUTPT VISIT, EST, LEVL III, 20-29 MIN: ICD-10-PCS | Mod: ,,, | Performed by: NURSE PRACTITIONER

## 2022-09-29 PROCEDURE — 81025 URINE PREGNANCY TEST: CPT | Mod: QW,,, | Performed by: NURSE PRACTITIONER

## 2022-09-29 PROCEDURE — 80305 POCT URINE DRUG SCREEN PRESUMP: ICD-10-PCS | Mod: QW,,, | Performed by: NURSE PRACTITIONER

## 2022-09-29 PROCEDURE — 99213 OFFICE O/P EST LOW 20 MIN: CPT | Mod: ,,, | Performed by: NURSE PRACTITIONER

## 2022-09-29 RX ORDER — DEXTROAMPHETAMINE SACCHARATE, AMPHETAMINE ASPARTATE, DEXTROAMPHETAMINE SULFATE AND AMPHETAMINE SULFATE 7.5; 7.5; 7.5; 7.5 MG/1; MG/1; MG/1; MG/1
30 TABLET ORAL EVERY MORNING
Qty: 30 TABLET | Refills: 0 | Status: SHIPPED | OUTPATIENT
Start: 2022-09-29 | End: 2022-12-28 | Stop reason: SDUPTHER

## 2022-09-29 RX ORDER — DEXTROAMPHETAMINE SACCHARATE, AMPHETAMINE ASPARTATE, DEXTROAMPHETAMINE SULFATE AND AMPHETAMINE SULFATE 7.5; 7.5; 7.5; 7.5 MG/1; MG/1; MG/1; MG/1
30 TABLET ORAL EVERY MORNING
Qty: 30 TABLET | Refills: 0 | Status: SHIPPED | OUTPATIENT
Start: 2022-10-29 | End: 2022-12-28 | Stop reason: SDUPTHER

## 2022-09-29 RX ORDER — DEXTROAMPHETAMINE SACCHARATE, AMPHETAMINE ASPARTATE, DEXTROAMPHETAMINE SULFATE AND AMPHETAMINE SULFATE 7.5; 7.5; 7.5; 7.5 MG/1; MG/1; MG/1; MG/1
1 TABLET ORAL DAILY
Qty: 30 TABLET | Refills: 0 | Status: SHIPPED | OUTPATIENT
Start: 2022-11-28 | End: 2022-12-28 | Stop reason: SDUPTHER

## 2022-09-29 NOTE — PROGRESS NOTES
Subjective:       Patient ID: Lauren Mitchell is a 32 y.o. female.    Chief Complaint: Follow-up and Medication Refill (Adderall. Last taken this am)    Ms. Mitchell presents in follow up for adult attention deficit disorder, she request medication refills, denies side effects of medications. She has a history of migraines with a chronic daily headache, she tried samples of nurtec at the last appointment, states it did improve headaches somewhat, she takes topamax daily which causes brain fog, but does seem to improve the frequency of her severe migraine headaches. She did not tolerated imitrex in the past, she states it exacerbated her orthostatic hypotension, states she is unable to stand and walk around without feeling she is going to pass out. She did see neurology in the past. She has never used an injectable medication for migraines.    She complains of lymph node enlargement to the posterior cervical chain, states it has been enlarged for a few days.    Review of Systems   Constitutional:  Negative for activity change and unexpected weight change.   HENT:  Negative for hearing loss, rhinorrhea and trouble swallowing.    Eyes:  Negative for discharge and visual disturbance.   Respiratory:  Negative for chest tightness and wheezing.    Cardiovascular:  Negative for palpitations.   Gastrointestinal:  Negative for blood in stool, constipation and diarrhea.   Endocrine: Negative for polydipsia and polyuria.   Genitourinary:  Negative for difficulty urinating, dysuria, hematuria and menstrual problem.   Psychiatric/Behavioral:  Negative for confusion, decreased concentration, dysphoric mood and sleep disturbance. The patient is not nervous/anxious.        Objective:      Physical Exam  Vitals and nursing note reviewed.   Constitutional:       Appearance: Normal appearance.   HENT:      Head: Normocephalic.   Cardiovascular:      Rate and Rhythm: Normal rate and regular rhythm.      Pulses: Normal pulses.      Heart  sounds: Normal heart sounds.   Pulmonary:      Effort: Pulmonary effort is normal.      Breath sounds: Normal breath sounds.   Abdominal:      General: Bowel sounds are normal.      Palpations: Abdomen is soft.   Musculoskeletal:         General: Normal range of motion.      Cervical back: Normal range of motion.   Lymphadenopathy:      Head:      Right side of head: Occipital adenopathy present.      Comments: 2-3 scabs noted to the base of the hairline, likely insect bites with slightly enlarged lymph node on the right.    Skin:     General: Skin is warm and dry.   Neurological:      Mental Status: She is alert and oriented to person, place, and time.   Psychiatric:         Behavior: Behavior normal.       Assessment:       Problem List Items Addressed This Visit          Neuro    Migraine     Other Visit Diagnoses       Adult attention deficit disorder    -  Primary    Relevant Medications    dextroamphetamine-amphetamine 30 mg Tab (Start on 11/28/2022)    Long-term use of high-risk medication        Relevant Orders    POCT Urine Drug Screen Presump (Completed)    POCT urine pregnancy (Completed)              Plan:        Refill adderall for daily use,  and UDS reviewed and appropriate (pos amphetamines), urine pregnancy negative.   Sample of ubrelvy given as an abortive, discussed and could try nurtec M,W,F or qulipta.   Lymph node likely enlarged due to insect bites? To posterior neck, monitor.     Reviewed 9/30/22 by ROBERTO Walton MD.

## 2022-10-11 ENCOUNTER — PATIENT MESSAGE (OUTPATIENT)
Dept: FAMILY MEDICINE | Facility: CLINIC | Age: 32
End: 2022-10-11
Payer: OTHER GOVERNMENT

## 2022-11-11 ENCOUNTER — OFFICE VISIT (OUTPATIENT)
Dept: FAMILY MEDICINE | Facility: CLINIC | Age: 32
End: 2022-11-11
Payer: OTHER GOVERNMENT

## 2022-11-11 VITALS
TEMPERATURE: 100 F | WEIGHT: 127 LBS | SYSTOLIC BLOOD PRESSURE: 100 MMHG | HEART RATE: 108 BPM | BODY MASS INDEX: 22.5 KG/M2 | OXYGEN SATURATION: 100 % | DIASTOLIC BLOOD PRESSURE: 70 MMHG | HEIGHT: 63 IN

## 2022-11-11 DIAGNOSIS — Z20.822 CONTACT WITH AND (SUSPECTED) EXPOSURE TO COVID-19: ICD-10-CM

## 2022-11-11 DIAGNOSIS — J10.1 INFLUENZA A: Primary | ICD-10-CM

## 2022-11-11 DIAGNOSIS — J02.9 SORE THROAT: ICD-10-CM

## 2022-11-11 LAB
CTP QC/QA: YES
CTP QC/QA: YES
FLUAV AG NPH QL: POSITIVE
FLUBV AG NPH QL: NEGATIVE
S PYO RRNA THROAT QL PROBE: NEGATIVE
SARS-COV-2 AG RESP QL IA.RAPID: NEGATIVE

## 2022-11-11 PROCEDURE — 87880 POCT RAPID STREP A: ICD-10-PCS | Mod: QW,,, | Performed by: NURSE PRACTITIONER

## 2022-11-11 PROCEDURE — 87880 STREP A ASSAY W/OPTIC: CPT | Mod: QW,,, | Performed by: NURSE PRACTITIONER

## 2022-11-11 PROCEDURE — 99213 OFFICE O/P EST LOW 20 MIN: CPT | Mod: ,,, | Performed by: NURSE PRACTITIONER

## 2022-11-11 PROCEDURE — 87428 POCT SARS-COV2 (COVID) WITH FLU ANTIGEN: ICD-10-PCS | Mod: QW,,, | Performed by: NURSE PRACTITIONER

## 2022-11-11 PROCEDURE — 99213 PR OFFICE/OUTPT VISIT, EST, LEVL III, 20-29 MIN: ICD-10-PCS | Mod: ,,, | Performed by: NURSE PRACTITIONER

## 2022-11-11 PROCEDURE — 87428 SARSCOV & INF VIR A&B AG IA: CPT | Mod: QW,,, | Performed by: NURSE PRACTITIONER

## 2022-11-11 RX ORDER — OSELTAMIVIR PHOSPHATE 75 MG/1
75 CAPSULE ORAL 2 TIMES DAILY
Qty: 10 CAPSULE | Refills: 0 | Status: SHIPPED | OUTPATIENT
Start: 2022-11-11 | End: 2022-11-16

## 2022-11-11 NOTE — LETTER
November 11, 2022    Lauren Mitchell  [address] [place] [address]             [facility]  Family Medicine  [facility] [address] [facility] [place] [address]  Phone: [phone]  Fax: [phone]   November 11, 2022     Patient: Lauren Mitchell   YOB: 1990   Date of Visit: 11/11/2022       To Whom it May Concern:    Lauren Mitchell was seen in my clinic on 11/11/2022. She may return to work on 11/14/22 .    Please excuse her from any classes or work missed.    If you have any questions or concerns, please don't hesitate to call.    Sincerely,         JEFF Aguilar

## 2022-11-11 NOTE — PROGRESS NOTES
[facility]    Chief Complaint      Chief Complaint   Patient presents with    Sore Throat    Cough     Non pro but feels like its trying to    Nasal Congestion    Headache    Fever     99    Fatigue    Ear Fullness     both    Chest Congestion    Documentation Only     Started Wednesday night; kid was positive for flu and strep       History of Present Illness      Lauren Mitchell is a 32 y.o. female. She  has no past medical history on file., who presents today for URI type symptoms x2 days.  States her child tested positive for Flu and Strep A.     Past Medical History:  History reviewed. No pertinent past medical history.    Past Surgical History:   has no past surgical history on file.    Social History:  Social History     Tobacco Use    Smoking status: Never    Smokeless tobacco: Never   Substance Use Topics    Alcohol use: Yes     Comment: occ    Drug use: Never       I personally reviewed all past medical, surgical, and social.     Review of Systems   Constitutional:  Positive for fatigue and fever. Negative for chills.   HENT:  Positive for congestion, ear pain and sore throat. Negative for sneezing.    Respiratory:  Positive for cough. Negative for shortness of breath.    Gastrointestinal:  Negative for diarrhea, nausea and vomiting.   Musculoskeletal:  Negative for myalgias.   Skin:  Negative for rash.   Neurological:  Positive for headaches.      Medications:  Outpatient Encounter Medications as of 11/11/2022   Medication Sig Dispense Refill    aspirin-caffeine (ANACIN) 400-32 mg Tab       cetirizine-pseudoephedrine 5-120 mg Tb12 Take by mouth.      cholecalciferol, vitamin D3, 125 mcg (5,000 unit) Tab Take 5,000 Units by mouth.      cyanocobalamin, vitamin B-12, (CYANOCOBALAMIN,VIT B-12,,BULK,) Powd Take by mouth.      dextroamphetamine-amphetamine 30 mg Tab Take 1 tablet (30 mg total) by mouth every morning. 30 tablet 0    iron,carbonyl-vitamin C (VITRON-C) 65 mg iron- 125 mg TbEC        "magnesium 250 mg Tab       promethazine (PHENERGAN) 25 MG tablet       pyridoxine, vitamin B6, (B-6) 100 MG Tab       sumatriptan (IMITREX) 50 MG tablet       topiramate (TOPAMAX) 50 MG tablet       dextroamphetamine-amphetamine 30 mg Tab Take 1 tablet (30 mg total) by mouth every morning. (Patient not taking: Reported on 11/11/2022) 30 tablet 0    [START ON 11/28/2022] dextroamphetamine-amphetamine 30 mg Tab Take 1 tablet (30 mg total) by mouth once daily. (Patient not taking: Reported on 11/11/2022) 30 tablet 0    fluticasone propionate (FLONASE) 50 mcg/actuation nasal spray 1 spray (50 mcg total) by Each Nostril route once daily. (Patient not taking: Reported on 11/11/2022) 16 g 2    oseltamivir (TAMIFLU) 75 MG capsule Take 1 capsule (75 mg total) by mouth 2 (two) times daily. for 5 days 10 capsule 0     No facility-administered encounter medications on file as of 11/11/2022.       Allergies:  Review of patient's allergies indicates:   Allergen Reactions    Bupropion Other (See Comments)     Made depression worse    Doxycycline Other (See Comments)     Palms/soles itchy, stomach cramps/diarrhea    Latex Other (See Comments)    Penicillins Rash       Health Maintenance:    There is no immunization history on file for this patient.   Health Maintenance   Topic Date Due    Hepatitis C Screening  Never done    Lipid Panel  Never done    TETANUS VACCINE  06/29/2023 (Originally 3/7/2009)        Physical Exam      Vital Signs  Temp: 99.9 °F (37.7 °C)  Temp src: Oral  Pulse: 108  SpO2: 100 %  BP: 100/70  Height and Weight  Height: 5' 3" (160 cm)  Weight: 57.6 kg (127 lb)  BSA (Calculated - sq m): 1.6 sq meters  BMI (Calculated): 22.5  Weight in (lb) to have BMI = 25: 140.8]    Physical Exam  Vitals and nursing note reviewed.   Constitutional:       General: She is not in acute distress.     Appearance: She is well-developed. She is ill-appearing.   HENT:      Head: Normocephalic.      Right Ear: Hearing normal.      Left " Ear: Hearing normal.      Nose: Nose normal.   Eyes:      General: Lids are normal.      Conjunctiva/sclera: Conjunctivae normal.   Cardiovascular:      Rate and Rhythm: Normal rate.   Pulmonary:      Effort: Pulmonary effort is normal. No respiratory distress.   Musculoskeletal:         General: Normal range of motion.      Cervical back: Normal range of motion and neck supple.   Skin:     General: Skin is warm and dry.   Neurological:      Mental Status: She is alert and oriented to person, place, and time.      Gait: Gait is intact.   Psychiatric:         Behavior: Behavior is cooperative.        Laboratory:  CBC:      CMP:        Invalid input(s): CREATININ  LIPIDS:      TSH:      A1C:        Assessment/Plan     Lauren Mitchell is a 32 y.o.female with:     1. Contact with and (suspected) exposure to covid-19  - POCT SARS-COV2 (COVID) with Flu Antigen    2. Sore throat  - POCT rapid strep A    3. Influenza A  - oseltamivir (TAMIFLU) 75 MG capsule; Take 1 capsule (75 mg total) by mouth 2 (two) times daily. for 5 days  Dispense: 10 capsule; Refill: 0       Total time spent face-to-face and non-face-to-face coordinating care for this encounter was: 20 minutes min    Chronic conditions status updated as per HPI.  Other than changes above, cont current medications and maintain follow up with specialists.  Return to clinic prn if symptoms worsen or fail to improve.    Yanna Shaikh, YONIP  [facility]

## 2022-12-28 ENCOUNTER — OFFICE VISIT (OUTPATIENT)
Dept: FAMILY MEDICINE | Facility: CLINIC | Age: 32
End: 2022-12-28
Payer: OTHER GOVERNMENT

## 2022-12-28 VITALS
HEIGHT: 63 IN | BODY MASS INDEX: 23.57 KG/M2 | HEART RATE: 93 BPM | SYSTOLIC BLOOD PRESSURE: 120 MMHG | RESPIRATION RATE: 18 BRPM | OXYGEN SATURATION: 99 % | DIASTOLIC BLOOD PRESSURE: 68 MMHG | TEMPERATURE: 97 F | WEIGHT: 133 LBS

## 2022-12-28 DIAGNOSIS — F98.8 ADULT ATTENTION DEFICIT DISORDER: Primary | ICD-10-CM

## 2022-12-28 DIAGNOSIS — Z79.899 LONG-TERM USE OF HIGH-RISK MEDICATION: ICD-10-CM

## 2022-12-28 DIAGNOSIS — G43.709 CHRONIC MIGRAINE WITHOUT AURA WITHOUT STATUS MIGRAINOSUS, NOT INTRACTABLE: ICD-10-CM

## 2022-12-28 PROCEDURE — 80305 DRUG TEST PRSMV DIR OPT OBS: CPT | Mod: QW,,, | Performed by: NURSE PRACTITIONER

## 2022-12-28 PROCEDURE — 81025 URINE PREGNANCY TEST: CPT | Mod: QW,,, | Performed by: NURSE PRACTITIONER

## 2022-12-28 PROCEDURE — 81025 POCT URINE PREGNANCY: ICD-10-PCS | Mod: QW,,, | Performed by: NURSE PRACTITIONER

## 2022-12-28 PROCEDURE — 80305 POCT URINE DRUG SCREEN PRESUMP: ICD-10-PCS | Mod: QW,,, | Performed by: NURSE PRACTITIONER

## 2022-12-28 PROCEDURE — 99213 OFFICE O/P EST LOW 20 MIN: CPT | Mod: ,,, | Performed by: NURSE PRACTITIONER

## 2022-12-28 PROCEDURE — 99213 PR OFFICE/OUTPT VISIT, EST, LEVL III, 20-29 MIN: ICD-10-PCS | Mod: ,,, | Performed by: NURSE PRACTITIONER

## 2022-12-28 RX ORDER — DEXTROAMPHETAMINE SACCHARATE, AMPHETAMINE ASPARTATE, DEXTROAMPHETAMINE SULFATE AND AMPHETAMINE SULFATE 7.5; 7.5; 7.5; 7.5 MG/1; MG/1; MG/1; MG/1
30 TABLET ORAL EVERY MORNING
Qty: 30 TABLET | Refills: 0 | Status: SHIPPED | OUTPATIENT
Start: 2022-12-28 | End: 2023-01-31

## 2022-12-28 RX ORDER — DEXTROAMPHETAMINE SACCHARATE, AMPHETAMINE ASPARTATE, DEXTROAMPHETAMINE SULFATE AND AMPHETAMINE SULFATE 7.5; 7.5; 7.5; 7.5 MG/1; MG/1; MG/1; MG/1
1 TABLET ORAL DAILY
Qty: 30 TABLET | Refills: 0 | Status: SHIPPED | OUTPATIENT
Start: 2023-02-28 | End: 2023-03-29 | Stop reason: SDUPTHER

## 2022-12-28 RX ORDER — DEXTROAMPHETAMINE SACCHARATE, AMPHETAMINE ASPARTATE, DEXTROAMPHETAMINE SULFATE AND AMPHETAMINE SULFATE 7.5; 7.5; 7.5; 7.5 MG/1; MG/1; MG/1; MG/1
30 TABLET ORAL EVERY MORNING
Qty: 30 TABLET | Refills: 0 | Status: SHIPPED | OUTPATIENT
Start: 2023-01-28 | End: 2023-02-27

## 2022-12-28 NOTE — PROGRESS NOTES
Subjective:       Patient ID: Lauren Mitchell is a 32 y.o. female.    Chief Complaint: Follow-up and Medication Refill    Ms. Mitchell presents to clinic in follow up for ADD and requests medication refills. She reports that medications continue to be effective and denies side effects. She has chronic migraines for which she takes topamax, she states that she has a dull daily headache that never goes away. Discussed a trial of nurtec, she has to have tried and failed an injectable - instructed pt. To check [employer] formulary and we can change med at next visit to new med covered by her insurance.     Follow-up  Associated symptoms include headaches. Pertinent negatives include no arthralgias, chest pain, joint swelling, neck pain, vomiting or weakness.   Medication Refill  Associated symptoms include headaches. Pertinent negatives include no arthralgias, chest pain, joint swelling, neck pain, vomiting or weakness.   Review of Systems   Constitutional:  Negative for activity change and unexpected weight change.   HENT:  Negative for hearing loss, rhinorrhea and trouble swallowing.    Eyes:  Negative for discharge and visual disturbance.   Respiratory:  Negative for chest tightness and wheezing.    Cardiovascular:  Negative for chest pain and palpitations.   Gastrointestinal:  Negative for blood in stool, constipation, diarrhea and vomiting.   Endocrine: Negative for polydipsia and polyuria.   Genitourinary:  Negative for difficulty urinating, dysuria, hematuria and menstrual problem.   Musculoskeletal:  Negative for arthralgias, joint swelling and neck pain.   Neurological:  Positive for headaches. Negative for weakness.   Psychiatric/Behavioral:  Negative for confusion and dysphoric mood.        Objective:      Physical Exam  Vitals and nursing note reviewed.   Constitutional:       Appearance: Normal appearance.   HENT:      Head: Normocephalic.   Cardiovascular:      Rate and Rhythm: Normal rate and regular rhythm.       Pulses: Normal pulses.      Heart sounds: Normal heart sounds.   Pulmonary:      Effort: Pulmonary effort is normal.      Breath sounds: Normal breath sounds.   Abdominal:      Palpations: Abdomen is soft.   Musculoskeletal:         General: Normal range of motion.   Skin:     General: Skin is warm and dry.   Neurological:      Mental Status: She is alert and oriented to person, place, and time.   Psychiatric:         Behavior: Behavior normal.       Assessment:       Problem List Items Addressed This Visit          Neuro    Migraine     Other Visit Diagnoses       Adult attention deficit disorder    -  Primary    Relevant Medications    dextroamphetamine-amphetamine 30 mg Tab (Start on 2/28/2023)    Long-term use of high-risk medication        Relevant Orders    POCT Urine Drug Screen Presump (Completed)    POCT urine pregnancy              Plan:         reviewed and appropriate, UDS pos amphetamines. Urine pregnancy pending. RF Adderall for 3 months, check formulary for new migraine medication.

## 2023-01-31 ENCOUNTER — TELEPHONE (OUTPATIENT)
Dept: FAMILY MEDICINE | Facility: CLINIC | Age: 33
End: 2023-01-31
Payer: OTHER GOVERNMENT

## 2023-01-31 RX ORDER — DEXTROAMPHETAMINE SACCHARATE, AMPHETAMINE ASPARTATE, DEXTROAMPHETAMINE SULFATE AND AMPHETAMINE SULFATE 5; 5; 5; 5 MG/1; MG/1; MG/1; MG/1
1 TABLET ORAL DAILY
Qty: 30 TABLET | Refills: 0 | Status: SHIPPED | OUTPATIENT
Start: 2023-01-31 | End: 2023-03-29

## 2023-01-31 NOTE — TELEPHONE ENCOUNTER
----- Message from Abigail Parra sent at 1/27/2023  1:10 PM CST -----  Pt needs a callback about the dosage of her medication [phone]

## 2023-03-01 ENCOUNTER — OFFICE VISIT (OUTPATIENT)
Dept: CARDIOLOGY | Facility: CLINIC | Age: 33
End: 2023-03-01
Payer: OTHER GOVERNMENT

## 2023-03-01 VITALS
OXYGEN SATURATION: 98 % | HEIGHT: 63 IN | SYSTOLIC BLOOD PRESSURE: 128 MMHG | HEART RATE: 84 BPM | WEIGHT: 133 LBS | DIASTOLIC BLOOD PRESSURE: 84 MMHG | BODY MASS INDEX: 23.57 KG/M2

## 2023-03-01 DIAGNOSIS — G43.709 CHRONIC MIGRAINE WITHOUT AURA WITHOUT STATUS MIGRAINOSUS, NOT INTRACTABLE: ICD-10-CM

## 2023-03-01 DIAGNOSIS — I95.1 ORTHOSTATIC HYPOTENSION: Primary | ICD-10-CM

## 2023-03-01 DIAGNOSIS — I49.3 ASYMPTOMATIC PVCS: ICD-10-CM

## 2023-03-01 DIAGNOSIS — F90.2 ADHD (ATTENTION DEFICIT HYPERACTIVITY DISORDER), COMBINED TYPE: ICD-10-CM

## 2023-03-01 DIAGNOSIS — R00.2 INTERMITTENT PALPITATIONS: ICD-10-CM

## 2023-03-01 PROCEDURE — 93010 EKG 12-LEAD: ICD-10-PCS | Mod: S$PBB,,, | Performed by: INTERNAL MEDICINE

## 2023-03-01 PROCEDURE — 93005 ELECTROCARDIOGRAM TRACING: CPT | Mod: PBBFAC | Performed by: INTERNAL MEDICINE

## 2023-03-01 PROCEDURE — 99214 OFFICE O/P EST MOD 30 MIN: CPT | Mod: PBBFAC | Performed by: INTERNAL MEDICINE

## 2023-03-01 PROCEDURE — 99214 OFFICE O/P EST MOD 30 MIN: CPT | Mod: S$PBB,,, | Performed by: INTERNAL MEDICINE

## 2023-03-01 PROCEDURE — 93010 ELECTROCARDIOGRAM REPORT: CPT | Mod: S$PBB,,, | Performed by: INTERNAL MEDICINE

## 2023-03-01 PROCEDURE — 99214 PR OFFICE/OUTPT VISIT, EST, LEVL IV, 30-39 MIN: ICD-10-PCS | Mod: S$PBB,,, | Performed by: INTERNAL MEDICINE

## 2023-03-19 NOTE — PROGRESS NOTES
Cardiology Clinic Note:    PCP: JEFF Cisneros    REFERRING PHYSICIAN: JEFF Cisneros    CHIEF COMPLAINT:   Chief Complaint   Patient presents with    Follow-up     One year f/u.     Palpitations     At times.         HISTORY OF PRESENT ILLNESS:  Lauren Mitchell is a 33 y.o. female who presents for evaluation of palpitations.     Patient states palpitations have resolved.  She has an occasional skip, comes and goes spontaneously, does not interfer with normal activities of daily living.  She denies chest pain, pressure, tightness or shortness of breath.           Review of Systems   Constitutional: Negative for diaphoresis, malaise/fatigue, night sweats and weight gain.   HENT:  Negative for congestion, ear pain, hearing loss, nosebleeds and sore throat.    Eyes:  Negative for blurred vision, double vision, pain, photophobia and visual disturbance.   Cardiovascular:  Positive for palpitations. Negative for chest pain, claudication, dyspnea on exertion, irregular heartbeat, leg swelling, near-syncope, orthopnea and syncope.   Respiratory:  Positive for shortness of breath. Negative for cough, sleep disturbances due to breathing, snoring and wheezing.    Endocrine: Negative for cold intolerance, heat intolerance, polydipsia, polyphagia and polyuria.   Hematologic/Lymphatic: Negative for bleeding problem. Does not bruise/bleed easily.   Skin:  Negative for dry skin, flushing, itching, rash and skin cancer.   Musculoskeletal:  Negative for arthritis, back pain, falls, joint pain, muscle cramps, muscle weakness and myalgias.   Gastrointestinal:  Negative for abdominal pain, change in bowel habit, constipation, diarrhea, dysphagia, heartburn, nausea and vomiting.   Genitourinary:  Negative for bladder incontinence, dysuria, flank pain, frequency and nocturia.   Neurological:  Positive for dizziness and headaches. Negative for focal weakness, light-headedness, loss of balance, numbness, paresthesias and  seizures.   Psychiatric/Behavioral:  Positive for depression. Negative for memory loss and substance abuse. The patient is nervous/anxious.    Allergic/Immunologic: Positive for environmental allergies.      PAST MEDICAL HISTORY:  No past medical history on file.    PAST SURGICAL HISTORY:  No past surgical history on file.    SOCIAL HISTORY:  Social History     Socioeconomic History    Marital status:    Tobacco Use    Smoking status: Never    Smokeless tobacco: Never   Substance and Sexual Activity    Alcohol use: Yes     Comment: occ    Drug use: Never    Sexual activity: Yes       FAMILY HISTORY:  Family History   Problem Relation Age of Onset    Diabetes Father     Mitral valve prolapse Maternal Uncle        ALLERGIES:  Allergies as of 03/01/2023 - Reviewed 03/01/2023   Allergen Reaction Noted    Bupropion Other (See Comments) 08/29/2019    Doxycycline Other (See Comments) 08/29/2019    Latex Other (See Comments) 08/29/2019    Penicillins Rash 12/06/2012         MEDICATIONS:  Current Outpatient Medications on File Prior to Visit   Medication Sig Dispense Refill    aspirin-caffeine (ANACIN) 400-32 mg Tab       cetirizine-pseudoephedrine 5-120 mg Tb12 Take by mouth.      cholecalciferol, vitamin D3, 125 mcg (5,000 unit) Tab Take 5,000 Units by mouth.      cyanocobalamin, vitamin B-12, (CYANOCOBALAMIN,VIT B-12,,BULK,) Powd Take by mouth.      dextroamphetamine-amphetamine 30 mg Tab Take 1 tablet (30 mg total) by mouth once daily. 30 tablet 0    fluticasone propionate (FLONASE) 50 mcg/actuation nasal spray 1 spray (50 mcg total) by Each Nostril route once daily. 16 g 2    iron,carbonyl-vitamin C (VITRON-C) 65 mg iron- 125 mg TbEC       magnesium 250 mg Tab       promethazine (PHENERGAN) 25 MG tablet       pyridoxine, vitamin B6, (B-6) 100 MG Tab       sumatriptan (IMITREX) 50 MG tablet       topiramate (TOPAMAX) 50 MG tablet       dextroamphetamine-amphetamine (ADDERALL) 20 mg tablet Take 1 tablet by mouth  "once daily. (Patient not taking: Reported on 3/1/2023) 30 tablet 0     No current facility-administered medications on file prior to visit.          PHYSICAL EXAM:  Blood pressure 128/84, pulse 84, height 5' 3" (1.6 m), weight 60.3 kg (133 lb), SpO2 98 %.  Wt Readings from Last 3 Encounters:   03/01/23 60.3 kg (133 lb)   12/28/22 60.3 kg (133 lb)   11/11/22 57.6 kg (127 lb)      Body mass index is 23.56 kg/m².    Physical Exam  Vitals and nursing note reviewed.   Constitutional:       Appearance: Normal appearance. She is normal weight.   HENT:      Head: Normocephalic and atraumatic.      Right Ear: External ear normal.      Left Ear: External ear normal.   Eyes:      General: No scleral icterus.        Right eye: No discharge.         Left eye: No discharge.      Extraocular Movements: Extraocular movements intact.      Conjunctiva/sclera: Conjunctivae normal.      Pupils: Pupils are equal, round, and reactive to light.   Cardiovascular:      Rate and Rhythm: Normal rate and regular rhythm.      Pulses: Normal pulses.      Heart sounds: Normal heart sounds. No murmur heard.    No friction rub. No gallop.   Pulmonary:      Effort: Pulmonary effort is normal.      Breath sounds: Normal breath sounds. No wheezing, rhonchi or rales.   Chest:      Chest wall: No tenderness.   Abdominal:      General: Abdomen is flat. Bowel sounds are normal. There is no distension.      Palpations: Abdomen is soft.      Tenderness: There is no abdominal tenderness. There is no guarding or rebound.   Musculoskeletal:         General: No swelling or tenderness. Normal range of motion.      Cervical back: Normal range of motion and neck supple.   Skin:     General: Skin is warm and dry.      Findings: No erythema or rash.   Neurological:      General: No focal deficit present.      Mental Status: She is alert and oriented to person, place, and time.      Cranial Nerves: No cranial nerve deficit.      Motor: No weakness.      Gait: Gait " normal.   Psychiatric:         Mood and Affect: Mood normal.         Behavior: Behavior normal.         Thought Content: Thought content normal.         Judgment: Judgment normal.      LABS REVIEWED:  No results found for: WBC, RBC, HGB, HCT, MCV, MCH, MCHC, RDW, PLT, MPV, NRBC, INR  No results found for: NA, K, CL, CO2, BUN, MG, PHOS  No results found for: CPK, AST, ALT  No results found for: GLU, HGBA1C  No results found for: CHOL, HDL, LDL, TRIG, CHOLHDL    CARDIAC STUDIES REVIEWED:  EKG  12/29/21 -   Normal sinus rhythm.  Rightward axis.  Septal infarct ,age undetermined   ECHO /1/11/22 -  The left ventricle is normal in size with normal systolic function.   EF 55%    OTHER IMAGING STUDIES REVIEWED:    ASSESSMENT: PLAN:    1.  Orthostatic hypotension, management with careful positional changes, encourage increase exercise, goal is  walk 30 min daily, 5 days weekly.   2.  ADHD, on Adderall x 9-10 yrs, takes daily.    3.  Migraines - on Topamax, followed by neurology in [place].  Keep diary of headaches.   4. Palpitations, benign PVCs,almost completely asymptomatic, no contraindication to adderal    Follow up in one year, sooner if symptoms change.

## 2023-03-29 ENCOUNTER — OFFICE VISIT (OUTPATIENT)
Dept: FAMILY MEDICINE | Facility: CLINIC | Age: 33
End: 2023-03-29
Payer: OTHER GOVERNMENT

## 2023-03-29 VITALS
HEART RATE: 88 BPM | RESPIRATION RATE: 19 BRPM | HEIGHT: 63 IN | WEIGHT: 137 LBS | SYSTOLIC BLOOD PRESSURE: 128 MMHG | BODY MASS INDEX: 24.27 KG/M2 | DIASTOLIC BLOOD PRESSURE: 84 MMHG | TEMPERATURE: 98 F | OXYGEN SATURATION: 99 %

## 2023-03-29 DIAGNOSIS — F98.8 ADULT ATTENTION DEFICIT DISORDER: Primary | ICD-10-CM

## 2023-03-29 DIAGNOSIS — Z79.899 LONG-TERM USE OF HIGH-RISK MEDICATION: ICD-10-CM

## 2023-03-29 DIAGNOSIS — G43.709 CHRONIC MIGRAINE WITHOUT AURA WITHOUT STATUS MIGRAINOSUS, NOT INTRACTABLE: ICD-10-CM

## 2023-03-29 DIAGNOSIS — J30.1 ALLERGIC RHINITIS DUE TO POLLEN, UNSPECIFIED SEASONALITY: ICD-10-CM

## 2023-03-29 LAB
B-HCG UR QL: NEGATIVE
CTP QC/QA: YES
CTP QC/QA: YES
POC (AMP) AMPHETAMINE: NEGATIVE
POC (BAR) BARBITURATES: NEGATIVE
POC (BUP) BUPRENORPHINE: NEGATIVE
POC (BZO) BENZODIAZEPINES: NEGATIVE
POC (COC) COCAINE: NEGATIVE
POC (MDMA) METHYLENEDIOXYMETHAMPHETAMINE 3,4: NEGATIVE
POC (MET) METHAMPHETAMINE: NEGATIVE
POC (MOP) OPIATES: NEGATIVE
POC (MTD) METHADONE: NEGATIVE
POC (OXY) OXYCODONE: NEGATIVE
POC (PCP) PHENCYCLIDINE: NEGATIVE
POC (TCA) TRICYCLIC ANTIDEPRESSANTS: NEGATIVE
POC TEMPERATURE (URINE): 90
POC THC: NEGATIVE

## 2023-03-29 PROCEDURE — 81025 POCT URINE PREGNANCY: ICD-10-PCS | Mod: QW,,, | Performed by: NURSE PRACTITIONER

## 2023-03-29 PROCEDURE — 81025 URINE PREGNANCY TEST: CPT | Mod: QW,,, | Performed by: NURSE PRACTITIONER

## 2023-03-29 PROCEDURE — 99213 PR OFFICE/OUTPT VISIT, EST, LEVL III, 20-29 MIN: ICD-10-PCS | Mod: ,,, | Performed by: NURSE PRACTITIONER

## 2023-03-29 PROCEDURE — 80305 POCT URINE DRUG SCREEN PRESUMP: ICD-10-PCS | Mod: QW,,, | Performed by: NURSE PRACTITIONER

## 2023-03-29 PROCEDURE — 80305 DRUG TEST PRSMV DIR OPT OBS: CPT | Mod: QW,,, | Performed by: NURSE PRACTITIONER

## 2023-03-29 PROCEDURE — 99213 OFFICE O/P EST LOW 20 MIN: CPT | Mod: ,,, | Performed by: NURSE PRACTITIONER

## 2023-03-29 RX ORDER — TOPIRAMATE 50 MG/1
50 TABLET, FILM COATED ORAL 2 TIMES DAILY
Qty: 180 TABLET | Refills: 1 | Status: SHIPPED | OUTPATIENT
Start: 2023-03-29 | End: 2023-06-27 | Stop reason: SDUPTHER

## 2023-03-29 RX ORDER — DEXTROAMPHETAMINE SACCHARATE, AMPHETAMINE ASPARTATE, DEXTROAMPHETAMINE SULFATE AND AMPHETAMINE SULFATE 7.5; 7.5; 7.5; 7.5 MG/1; MG/1; MG/1; MG/1
1 TABLET ORAL DAILY
Qty: 30 TABLET | Refills: 0 | Status: SHIPPED | OUTPATIENT
Start: 2023-03-29 | End: 2023-06-27 | Stop reason: SDUPTHER

## 2023-03-29 RX ORDER — DEXTROAMPHETAMINE SACCHARATE, AMPHETAMINE ASPARTATE, DEXTROAMPHETAMINE SULFATE AND AMPHETAMINE SULFATE 7.5; 7.5; 7.5; 7.5 MG/1; MG/1; MG/1; MG/1
1 TABLET ORAL DAILY
Qty: 30 TABLET | Refills: 0 | Status: CANCELLED | OUTPATIENT
Start: 2023-03-29 | End: 2023-04-28

## 2023-03-29 RX ORDER — CETIRIZINE HYDROCHLORIDE, PSEUDOEPHEDRINE HYDROCHLORIDE 5; 120 MG/1; MG/1
1 TABLET, FILM COATED, EXTENDED RELEASE ORAL DAILY
Qty: 30 TABLET | Refills: 1 | Status: SHIPPED | OUTPATIENT
Start: 2023-03-29 | End: 2023-06-27 | Stop reason: SDUPTHER

## 2023-03-29 NOTE — PROGRESS NOTES
JEFF Cisneros        PATIENT NAME: Lauren Mitchell  : 1990  DATE: 3/29/23  MRN: [id number]      Billing Provider: JEFF Cisnerso  Level of Service: NM OFFICE/OUTPT VISIT, EST, LEVL III, 20-29 MIN  Patient PCP Information       Provider PCP Type    JEFF Cisneros [facility]            Reason for Visit / Chief Complaint: Medication Refill (Adderal last taken this am- /nonfasting) and Headache (Dull headache- 3/10/Pt reports running out of Topamax and insurance will not cover Nurtec/Pt is also requesting Zyrtec D)         History of Present Illness / Problem Focused Workflow     Lauren Mitchell presents to the clinic with Medication Refill (Adderal last taken this am- /nonfasting) and Headache (Dull headache- 3/10/Pt reports running out of Topamax and insurance will not cover Nurtec/Pt is also requesting Zyrtec D)     Ms. Mitchell presents to clinic in follow up for ADD, she reports medications have been effective without side effects.  and UDS reviewed and appropriate. She requests a refill on topamax for migrain prevention, unable to get nurtec and cannot take imitrex due to it causing severe dizziness.    She has allergic rhinitis and requests zyrtec D for use during spring.    Medication Refill  Associated symptoms include headaches. Pertinent negatives include no arthralgias, chest pain, joint swelling, neck pain, vomiting or weakness.   Headache   Pertinent negatives include no hearing loss, neck pain, rhinorrhea, vomiting or weakness.     Review of Systems     Review of Systems   Constitutional:  Negative for activity change and unexpected weight change.   HENT:  Negative for hearing loss, rhinorrhea and trouble swallowing.    Eyes:  Negative for discharge and visual disturbance.   Respiratory:  Negative for chest tightness and wheezing.    Cardiovascular:  Negative for chest pain and palpitations.   Gastrointestinal:  Negative for blood in stool, constipation, diarrhea and vomiting.    Endocrine: Negative for polydipsia and polyuria.   Genitourinary:  Negative for difficulty urinating, dysuria, hematuria and menstrual problem.   Musculoskeletal:  Negative for arthralgias, joint swelling and neck pain.   Allergic/Immunologic: Positive for environmental allergies.   Neurological:  Positive for headaches. Negative for weakness.   Psychiatric/Behavioral:  Negative for confusion and dysphoric mood.       Medical / Social / Family History   No past medical history on file.    No past surgical history on file.    Social History  Ms. Lauren Mitchell   reports that she has never smoked. She has been exposed to tobacco smoke. She has never used smokeless tobacco. She reports current alcohol use. She reports that she does not use drugs.    Family History  Ms.'loren Mitchell  family history includes Diabetes in her father; Mitral valve prolapse in her maternal uncle.    Medications and Allergies     Medications  Outpatient Medications Marked as Taking for the 3/29/23 encounter (Office Visit) with JEFF Cisneros   Medication Sig Dispense Refill    aspirin-caffeine (ANACIN) 400-32 mg Tab       cholecalciferol, vitamin D3, 125 mcg (5,000 unit) Tab Take 5,000 Units by mouth.      cyanocobalamin, vitamin B-12, (CYANOCOBALAMIN,VIT B-12,,BULK,) Powd Take by mouth.      iron,carbonyl-vitamin C (VITRON-C) 65 mg iron- 125 mg TbEC       magnesium 250 mg Tab       promethazine (PHENERGAN) 25 MG tablet       pyridoxine, vitamin B6, (B-6) 100 MG Tab       sumatriptan (IMITREX) 50 MG tablet       [DISCONTINUED] cetirizine-pseudoephedrine 5-120 mg Tb12 Take by mouth.      [DISCONTINUED] dextroamphetamine-amphetamine (ADDERALL) 20 mg tablet Take 1 tablet by mouth once daily. 30 tablet 0    [DISCONTINUED] dextroamphetamine-amphetamine 30 mg Tab Take 1 tablet (30 mg total) by mouth once daily. 30 tablet 0    [DISCONTINUED] topiramate (TOPAMAX) 50 MG tablet          Allergies  Review of patient's allergies indicates:    Allergen Reactions    Bupropion Other (See Comments)     Made depression worse    Doxycycline Other (See Comments)     Palms/soles itchy, stomach cramps/diarrhea    Latex Other (See Comments)    Penicillins Rash         Vitals:    03/29/23 0817   BP: 128/84   Pulse: 88   Resp: 19   Temp: 98.1 °F (36.7 °C)     Physical Exam  Vitals and nursing note reviewed.   Constitutional:       Appearance: Normal appearance.   HENT:      Head: Normocephalic.   Cardiovascular:      Rate and Rhythm: Normal rate and regular rhythm.      Pulses: Normal pulses.      Heart sounds: Normal heart sounds.   Pulmonary:      Effort: Pulmonary effort is normal.      Breath sounds: Normal breath sounds.   Abdominal:      General: Bowel sounds are normal.      Palpations: Abdomen is soft.   Musculoskeletal:         General: Normal range of motion.   Skin:     General: Skin is warm and dry.   Neurological:      Mental Status: She is alert and oriented to person, place, and time.   Psychiatric:         Behavior: Behavior normal.          No results found for: WBC, HGB, HCT, MCV, PLT       No results found for: NA, K, CL, CO2, GLU, BUN, CREATININE, CALCIUM, PROT, ALBUMIN, BILITOT, ALKPHOS, AST, ALT, ANIONGAP, EGFRNORACEVR     No results found for: CHOL  No results found for: HDL  No results found for: LDLCALC  No results found for: TRIG  No results found for: CHOLHDL     No results found for: LABA1C, HGBA1C     No results found for: TSH, C9KEONO, S2VQLCG, THYROIDAB, FREET4     No results found for: PSA, PSATOTAL, PSAFREE, PSAFREEPCT       Health Maintenance Due   Topic Date Due    Hepatitis C Screening  Never done    Lipid Panel  Never done       Problem List Items Addressed This Visit          Neuro    Migraine     Other Visit Diagnoses       Adult attention deficit disorder    -  Primary    Relevant Medications    dextroamphetamine-amphetamine 30 mg Tab    Long-term use of high-risk medication        Relevant Orders    POCT Urine Drug Screen  Presump (Completed)    POCT urine pregnancy (Completed)    Allergic rhinitis due to pollen, unspecified seasonality               reviewed and appropriate, pt. Did have to change medication dose due to 30mg on back order.  UDS negative as she is out of medication, appropriate.      The patient has no Health Maintenance topics of status Not Due    Future Appointments   Date Time Provider Department Center   6/29/2023  9:15 AM Sanjay Walton MD [facility]        There are no Patient Instructions on file for this visit.  No follow-ups on file.       Date of encounter: 3/29/23  Reviewed on 3/31/23 by Sanjay Walton MD

## 2023-06-27 ENCOUNTER — OFFICE VISIT (OUTPATIENT)
Dept: FAMILY MEDICINE | Facility: CLINIC | Age: 33
End: 2023-06-27
Payer: OTHER GOVERNMENT

## 2023-06-27 VITALS
WEIGHT: 135 LBS | RESPIRATION RATE: 19 BRPM | HEIGHT: 63 IN | DIASTOLIC BLOOD PRESSURE: 72 MMHG | BODY MASS INDEX: 23.92 KG/M2 | TEMPERATURE: 98 F | OXYGEN SATURATION: 99 % | HEART RATE: 103 BPM | SYSTOLIC BLOOD PRESSURE: 92 MMHG

## 2023-06-27 DIAGNOSIS — I95.1 ORTHOSTATIC HYPOTENSION: ICD-10-CM

## 2023-06-27 DIAGNOSIS — J30.1 ALLERGIC RHINITIS DUE TO POLLEN, UNSPECIFIED SEASONALITY: ICD-10-CM

## 2023-06-27 DIAGNOSIS — G43.709 CHRONIC MIGRAINE WITHOUT AURA WITHOUT STATUS MIGRAINOSUS, NOT INTRACTABLE: ICD-10-CM

## 2023-06-27 DIAGNOSIS — Z79.899 LONG-TERM USE OF HIGH-RISK MEDICATION: ICD-10-CM

## 2023-06-27 DIAGNOSIS — F98.8 ADULT ATTENTION DEFICIT DISORDER: Primary | ICD-10-CM

## 2023-06-27 LAB
B-HCG UR QL: NEGATIVE
CTP QC/QA: YES
CTP QC/QA: YES
POC (AMP) AMPHETAMINE: NEGATIVE
POC (BAR) BARBITURATES: NEGATIVE
POC (BUP) BUPRENORPHINE: NEGATIVE
POC (BZO) BENZODIAZEPINES: NEGATIVE
POC (COC) COCAINE: NEGATIVE
POC (MDMA) METHYLENEDIOXYMETHAMPHETAMINE 3,4: NEGATIVE
POC (MET) METHAMPHETAMINE: NEGATIVE
POC (MOP) OPIATES: NEGATIVE
POC (MTD) METHADONE: NEGATIVE
POC (OXY) OXYCODONE: NEGATIVE
POC (PCP) PHENCYCLIDINE: NEGATIVE
POC (TCA) TRICYCLIC ANTIDEPRESSANTS: NEGATIVE
POC TEMPERATURE (URINE): 92
POC THC: NEGATIVE

## 2023-06-27 PROCEDURE — 80305 POCT URINE DRUG SCREEN PRESUMP: ICD-10-PCS | Mod: QW,,, | Performed by: FAMILY MEDICINE

## 2023-06-27 PROCEDURE — 80305 DRUG TEST PRSMV DIR OPT OBS: CPT | Mod: QW,,, | Performed by: FAMILY MEDICINE

## 2023-06-27 PROCEDURE — 99214 PR OFFICE/OUTPT VISIT, EST, LEVL IV, 30-39 MIN: ICD-10-PCS | Mod: ,,, | Performed by: FAMILY MEDICINE

## 2023-06-27 PROCEDURE — 81025 URINE PREGNANCY TEST: CPT | Mod: QW,,, | Performed by: FAMILY MEDICINE

## 2023-06-27 PROCEDURE — 81025 POCT URINE PREGNANCY: ICD-10-PCS | Mod: QW,,, | Performed by: FAMILY MEDICINE

## 2023-06-27 PROCEDURE — 99214 OFFICE O/P EST MOD 30 MIN: CPT | Mod: ,,, | Performed by: FAMILY MEDICINE

## 2023-06-27 RX ORDER — AZELASTINE 1 MG/ML
2 SPRAY, METERED NASAL 2 TIMES DAILY
Qty: 90 ML | Refills: 1 | Status: SHIPPED | OUTPATIENT
Start: 2023-06-27 | End: 2024-06-26

## 2023-06-27 RX ORDER — DEXTROAMPHETAMINE SACCHARATE, AMPHETAMINE ASPARTATE, DEXTROAMPHETAMINE SULFATE AND AMPHETAMINE SULFATE 7.5; 7.5; 7.5; 7.5 MG/1; MG/1; MG/1; MG/1
1 TABLET ORAL DAILY
Qty: 30 TABLET | Refills: 0 | Status: SHIPPED | OUTPATIENT
Start: 2023-06-27 | End: 2023-09-27 | Stop reason: SDUPTHER

## 2023-06-27 RX ORDER — CETIRIZINE HYDROCHLORIDE, PSEUDOEPHEDRINE HYDROCHLORIDE 5; 120 MG/1; MG/1
1 TABLET, FILM COATED, EXTENDED RELEASE ORAL DAILY
Qty: 30 TABLET | Refills: 2 | Status: SHIPPED | OUTPATIENT
Start: 2023-06-27

## 2023-06-27 RX ORDER — TOPIRAMATE 50 MG/1
50 TABLET, FILM COATED ORAL 2 TIMES DAILY
Qty: 180 TABLET | Refills: 1 | Status: SHIPPED | OUTPATIENT
Start: 2023-06-27

## 2023-06-27 NOTE — PROGRESS NOTES
Lauren Mitchell is a 33 y.o. female seen today for follow-up on her allergic rhinitis which she reports is still problematic and we discussed adding some Astelin to her regimen.  Patient also has attention deficit disorder and reports her Adderall is working well although she does have a rare palpitation when she takes the Adderall and the Zyrtec D. hopefully with the adding of Astelin we can wean her off the Zyrtec D. patient has had no signs or symptoms of tolerance or addiction and her  today was appropriate.  Her urine drug screen was completely negative and I have sent off a definitive drug screen.      History reviewed. No pertinent past medical history.  Family History   Problem Relation Age of Onset    Diabetes Father     Mitral valve prolapse Maternal Uncle      Current Outpatient Medications on File Prior to Visit   Medication Sig Dispense Refill    aspirin-caffeine (ANACIN) 400-32 mg Tab       cholecalciferol, vitamin D3, 125 mcg (5,000 unit) Tab Take 5,000 Units by mouth.      cyanocobalamin, vitamin B-12, (CYANOCOBALAMIN,VIT B-12,,BULK,) Powd Take by mouth.      iron,carbonyl-vitamin C (VITRON-C) 65 mg iron- 125 mg TbEC       magnesium 250 mg Tab       promethazine (PHENERGAN) 25 MG tablet       pyridoxine, vitamin B6, (B-6) 100 MG Tab       sumatriptan (IMITREX) 50 MG tablet       [DISCONTINUED] cetirizine-pseudoephedrine 5-120 mg Tb12 Take 1 tablet by mouth Daily. 30 tablet 1    [DISCONTINUED] topiramate (TOPAMAX) 50 MG tablet Take 1 tablet (50 mg total) by mouth 2 (two) times daily. 180 tablet 1    fluticasone propionate (FLONASE) 50 mcg/actuation nasal spray 1 spray (50 mcg total) by Each Nostril route once daily. (Patient not taking: Reported on 3/29/2023) 16 g 2    [DISCONTINUED] dextroamphetamine-amphetamine 30 mg Tab Take 1 tablet (30 mg total) by mouth once daily. 30 tablet 0     No current facility-administered medications on file prior to visit.       There is no immunization history on file  for this patient.    Review of Systems   HENT:  Positive for congestion. Negative for hearing loss.    Eyes:  Negative for discharge.   Respiratory:  Negative for wheezing.    Cardiovascular:  Negative for chest pain and palpitations.   Gastrointestinal:  Negative for blood in stool, constipation, diarrhea and vomiting.   Genitourinary:  Negative for dysuria and hematuria.   Musculoskeletal:  Negative for neck pain.   Neurological:  Positive for headaches. Negative for weakness.   Endo/Heme/Allergies:  Negative for polydipsia.      Vitals:    06/27/23 0920   BP: 92/72   Pulse: 103   Resp: 19   Temp: 98.1 °F (36.7 °C)       Physical Exam  Vitals reviewed.   Constitutional:       Appearance: Normal appearance.   HENT:      Head: Normocephalic.   Eyes:      Extraocular Movements: Extraocular movements intact.      Conjunctiva/sclera: Conjunctivae normal.      Pupils: Pupils are equal, round, and reactive to light.   Neck:      Thyroid: No thyroid mass or thyromegaly.   Cardiovascular:      Rate and Rhythm: Normal rate and regular rhythm.      Heart sounds: Normal heart sounds. No murmur heard.    No gallop.   Pulmonary:      Effort: Pulmonary effort is normal. No respiratory distress.      Breath sounds: Normal breath sounds. No wheezing or rales.   Skin:     General: Skin is warm and dry.      Coloration: Skin is not jaundiced or pale.   Neurological:      Mental Status: She is alert.   Psychiatric:         Mood and Affect: Mood normal.         Behavior: Behavior normal.         Thought Content: Thought content normal.         Judgment: Judgment normal.        Assessment and Plan  Adult attention deficit disorder  -     dextroamphetamine-amphetamine 30 mg Tab; Take 1 tablet (30 mg total) by mouth once daily.  Dispense: 30 tablet; Refill: 0    Long-term use of high-risk medication  -     POCT urine pregnancy  -     POCT Urine Drug Screen Presump  -     Miscellaneous Test, Sendout Definitive urine drug screen; Future;  Expected date: 06/27/2023    Orthostatic hypotension  -     CBC Auto Differential; Future; Expected date: 06/27/2023  -     Comprehensive Metabolic Panel; Future; Expected date: 06/27/2023    Chronic migraine without aura without status migrainosus, not intractable  -     topiramate (TOPAMAX) 50 MG tablet; Take 1 tablet (50 mg total) by mouth 2 (two) times daily.  Dispense: 180 tablet; Refill: 1    Allergic rhinitis due to pollen, unspecified seasonality  -     cetirizine-pseudoephedrine 5-120 mg Tb12; Take 1 tablet by mouth Daily.  Dispense: 30 tablet; Refill: 2    Other orders  -     azelastine (ASTELIN) 137 mcg (0.1 %) nasal spray; 2 sprays (274 mcg total) by Nasal route 2 (two) times daily.  Dispense: 90 mL; Refill: 1            Return to clinic in 3 months or as needed.  If her allergy symptoms do not improve I have asked the patient to call back and we can set her up with an allergy evaluation.    The patient has no Health Maintenance topics of status Not Due    Answers submitted by the patient for this visit:  Review of Systems Questionnaire (Submitted on 6/26/2023)  activity change: No  unexpected weight change: No  rhinorrhea: No  trouble swallowing: No  visual disturbance: No  chest tightness: No  polyuria: No  difficulty urinating: No  menstrual problem: No  joint swelling: No  arthralgias: No  confusion: No  dysphoric mood: No

## 2023-07-07 LAB — BEAKER SEE SCANNED REPORT: NORMAL

## 2023-07-20 ENCOUNTER — PATIENT MESSAGE (OUTPATIENT)
Dept: ADMINISTRATIVE | Facility: HOSPITAL | Age: 33
End: 2023-07-20

## 2023-07-20 ENCOUNTER — OFFICE VISIT (OUTPATIENT)
Dept: FAMILY MEDICINE | Facility: CLINIC | Age: 33
End: 2023-07-20
Payer: OTHER GOVERNMENT

## 2023-07-20 VITALS
HEIGHT: 63 IN | RESPIRATION RATE: 19 BRPM | WEIGHT: 134 LBS | DIASTOLIC BLOOD PRESSURE: 82 MMHG | TEMPERATURE: 98 F | OXYGEN SATURATION: 99 % | SYSTOLIC BLOOD PRESSURE: 108 MMHG | BODY MASS INDEX: 23.74 KG/M2 | HEART RATE: 99 BPM

## 2023-07-20 DIAGNOSIS — K12.1 ULCERATION, ORAL MUCOSA: Primary | ICD-10-CM

## 2023-07-20 PROCEDURE — 99212 PR OFFICE/OUTPT VISIT, EST, LEVL II, 10-19 MIN: ICD-10-PCS | Mod: ,,, | Performed by: NURSE PRACTITIONER

## 2023-07-20 PROCEDURE — 99212 OFFICE O/P EST SF 10 MIN: CPT | Mod: ,,, | Performed by: NURSE PRACTITIONER

## 2023-07-20 RX ORDER — VALACYCLOVIR HYDROCHLORIDE 500 MG/1
500 TABLET, FILM COATED ORAL 3 TIMES DAILY
Qty: 21 TABLET | Refills: 0 | Status: SHIPPED | OUTPATIENT
Start: 2023-07-20 | End: 2023-07-27

## 2023-07-20 NOTE — PROGRESS NOTES
[facility]    Chief Complaint      Chief Complaint   Patient presents with    Mouth Lesions     To right side of lip  Does not remember when she bit her lip but was less than one week ago    Jaw Pain     Ongoing since yesterday evening  Right side     lymph node pain      And swelling to right side of neck   Ongoing since yesterday evening    care gaps      Lipid panel to be determined by Yanna Shaikh NP        History of Present Illness      Lauren Mitchell is a 33 y.o. female. She  has no past medical history on file., who presents today for ulcers on the bottom inner R side of her lip.  She states she bit her lip about a week ago and but then these ulcers came up on her lip about a day or two ago.  She is having jaw pain and lymph nodes are swollen.     Past Medical History:  History reviewed. No pertinent past medical history.    Past Surgical History:   has no past surgical history on file.    Social History:  Social History     Tobacco Use    Smoking status: Never     Passive exposure: Past    Smokeless tobacco: Never   Substance Use Topics    Alcohol use: Yes     Comment: occ    Drug use: Never       I personally reviewed all past medical, surgical, and social.     Review of Systems   Constitutional:  Positive for fatigue. Negative for chills and fever.   HENT:  Positive for mouth sores. Negative for sore throat.    Eyes:  Negative for pain.   Respiratory:  Negative for cough and shortness of breath.    Cardiovascular: Negative.    Gastrointestinal:  Negative for abdominal pain, constipation and diarrhea.   Musculoskeletal:  Negative for gait problem.   Skin: Negative.    Neurological:  Positive for headaches.      Medications:  Outpatient Encounter Medications as of 7/20/2023   Medication Sig Dispense Refill    aspirin-caffeine (ANACIN) 400-32 mg Tab       azelastine (ASTELIN) 137 mcg (0.1 %) nasal spray 2 sprays (274 mcg total) by Nasal route 2 (two) times daily. 90 mL 1     "cetirizine-pseudoephedrine 5-120 mg Tb12 Take 1 tablet by mouth Daily. 30 tablet 2    cholecalciferol, vitamin D3, 125 mcg (5,000 unit) Tab Take 5,000 Units by mouth.      cyanocobalamin, vitamin B-12, (CYANOCOBALAMIN,VIT B-12,,BULK,) Powd Take by mouth.      dextroamphetamine-amphetamine 30 mg Tab Take 1 tablet (30 mg total) by mouth once daily. 30 tablet 0    fluticasone propionate (FLONASE) 50 mcg/actuation nasal spray 1 spray (50 mcg total) by Each Nostril route once daily. 16 g 2    iron,carbonyl-vitamin C (VITRON-C) 65 mg iron- 125 mg TbEC       magnesium 250 mg Tab       promethazine (PHENERGAN) 25 MG tablet       pyridoxine, vitamin B6, (B-6) 100 MG Tab       sumatriptan (IMITREX) 50 MG tablet       topiramate (TOPAMAX) 50 MG tablet Take 1 tablet (50 mg total) by mouth 2 (two) times daily. 180 tablet 1    valACYclovir (VALTREX) 500 MG tablet Take 1 tablet (500 mg total) by mouth 3 (three) times daily. for 7 days 21 tablet 0     No facility-administered encounter medications on file as of 7/20/2023.       Allergies:  Review of patient's allergies indicates:   Allergen Reactions    Bupropion Other (See Comments)     Made depression worse    Doxycycline Other (See Comments)     Palms/soles itchy, stomach cramps/diarrhea    Latex Other (See Comments)    Penicillins Rash       Health Maintenance:    There is no immunization history on file for this patient.   Health Maintenance   Topic Date Due    Hepatitis C Screening  Never done    Lipid Panel  Never done    TETANUS VACCINE  Never done        Physical Exam      Vital Signs  Temp: 97.9 °F (36.6 °C)  Temp Source: Oral  Pulse: 99  Resp: 19  SpO2: 99 %  BP: 108/82  BP Location: Left arm  Patient Position: Sitting  Pain Score:   5  Pain Loc: Jaw  Height and Weight  Height: 5' 3" (160 cm)  Weight: 60.8 kg (134 lb)  BSA (Calculated - sq m): 1.64 sq meters  BMI (Calculated): 23.7  Weight in (lb) to have BMI = 25: 140.8]    Physical Exam  Vitals and nursing note " reviewed.   Constitutional:       Appearance: Normal appearance. She is well-developed.   HENT:      Head: Normocephalic.      Right Ear: Hearing normal.      Left Ear: Hearing normal.      Nose: Nose normal.      Mouth/Throat:      Lips: Lesions present.        Comments: 2 ulcerations to   Eyes:      General: Lids are normal.      Extraocular Movements: Extraocular movements intact.      Conjunctiva/sclera: Conjunctivae normal.      Pupils: Pupils are equal, round, and reactive to light.   Cardiovascular:      Rate and Rhythm: Normal rate and regular rhythm.      Heart sounds: Normal heart sounds.   Pulmonary:      Effort: Pulmonary effort is normal.      Breath sounds: Normal breath sounds.   Musculoskeletal:         General: Normal range of motion.      Cervical back: Normal range of motion and neck supple.      Right lower leg: No edema.      Left lower leg: No edema.   Lymphadenopathy:      Head:      Right side of head: Submental adenopathy present.   Skin:     General: Skin is warm and dry.   Neurological:      Mental Status: She is alert and oriented to person, place, and time.      Gait: Gait is intact.   Psychiatric:         Behavior: Behavior is cooperative.        Laboratory:  CBC:      CMP:        Invalid input(s): CREATININ  LIPIDS:      TSH:      A1C:        Assessment/Plan     Lauren Mitchell is a 33 y.o.female with:     1. Ulceration, oral mucosa  - valACYclovir (VALTREX) 500 MG tablet; Take 1 tablet (500 mg total) by mouth 3 (three) times daily. for 7 days  Dispense: 21 tablet; Refill: 0       Total time spent face-to-face and non-face-to-face coordinating care for this encounter was:  10  min    Chronic conditions status updated as per HPI.  Other than changes above, cont current medications and maintain follow up with specialists.  Return to clinic prn if symptoms worsen or fail to improve.    Yanna Shaikh, YONIP  [facility]

## 2023-08-19 ENCOUNTER — PATIENT MESSAGE (OUTPATIENT)
Dept: ADMINISTRATIVE | Facility: HOSPITAL | Age: 33
End: 2023-08-19

## 2023-08-23 ENCOUNTER — PATIENT OUTREACH (OUTPATIENT)
Dept: ADMINISTRATIVE | Facility: HOSPITAL | Age: 33
End: 2023-08-23

## 2023-08-23 NOTE — PROGRESS NOTES
Health maintenance record review for population health care gaps    SONIA sent to [facility] for recent pap smear and lab results    Noted to appt for patient to have lipid panel drawn on next visit 9/23 for compliance    Patient Questionnaire Submission  --------------------------------     Questionnaire: Preventative Care Screenings     Question: From the screening(s) listed above, please select the ones you would like to schedule:  Answer:   Lipid Panel     Question: Have you completed any of the following screenings at a non-[facility] location recently? Select all that apply or NONE:  Answer:   Cervical Cancer Screening     Question: Name of location(s) where outside labs were done:  Answer:   [facility]     Question: When is the best time to call you?  Answer:   4:00 PM - 5:00 PM     Question: What is the best number to reach you?  Answer:   [phone]    Health Maintenance Due   Topic Date Due    Hepatitis C Screening  Never done    Cervical Cancer Screening  Never done    Lipid Panel  Never done    COVID-19 Vaccine (1) Never done    HIV Screening  Never done    TETANUS VACCINE  Never done

## 2023-08-23 NOTE — LETTER
AUTHORIZATION FOR RELEASE OF   CONFIDENTIAL INFORMATION    Dear [facility],    We are seeing Lauren Mitchell, date of birth 1990, in the clinic at [facility]. Sanjay Walton MD is the patient's PCP. Lauren Mitchell has an outstanding lab/procedure at the time we reviewed her chart. In order to help keep her health information updated, she has authorized us to request the following medical record(s):        (  )  MAMMOGRAM                                      (  )  COLONOSCOPY      ( X )  PAP SMEAR                                          ( X )  OUTSIDE LAB RESULTS     (  )  DEXA SCAN                                          (  )  EYE EXAM            (  )  FOOT EXAM                                          (  )  ENTIRE RECORD     (  )  OUTSIDE IMMUNIZATIONS                 (  )  _______________         Please fax records to [facility], [place], Edward D, MD, [phone]     If you have any questions, please contact Renata Espinoza Lpn-Clinic Care Coordinator at [phone].           Patient Name: Lauren Mitchell  : 1990  Patient Phone #: [phone]

## 2023-09-27 ENCOUNTER — OFFICE VISIT (OUTPATIENT)
Dept: FAMILY MEDICINE | Facility: CLINIC | Age: 33
End: 2023-09-27
Payer: OTHER GOVERNMENT

## 2023-09-27 VITALS
TEMPERATURE: 98 F | OXYGEN SATURATION: 99 % | DIASTOLIC BLOOD PRESSURE: 84 MMHG | HEART RATE: 100 BPM | BODY MASS INDEX: 23.21 KG/M2 | RESPIRATION RATE: 18 BRPM | HEIGHT: 63 IN | SYSTOLIC BLOOD PRESSURE: 130 MMHG | WEIGHT: 131 LBS

## 2023-09-27 DIAGNOSIS — Z00.00 ROUTINE GENERAL MEDICAL EXAMINATION AT A HEALTH CARE FACILITY: Primary | ICD-10-CM

## 2023-09-27 DIAGNOSIS — F98.8 ADULT ATTENTION DEFICIT DISORDER: ICD-10-CM

## 2023-09-27 DIAGNOSIS — Z79.899 LONG-TERM USE OF HIGH-RISK MEDICATION: ICD-10-CM

## 2023-09-27 DIAGNOSIS — Z13.1 SCREENING FOR DIABETES MELLITUS: ICD-10-CM

## 2023-09-27 DIAGNOSIS — Z13.220 SCREENING FOR LIPOID DISORDERS: ICD-10-CM

## 2023-09-27 LAB
CTP QC/QA: YES
POC (AMP) AMPHETAMINE: ABNORMAL
POC (BAR) BARBITURATES: NEGATIVE
POC (BUP) BUPRENORPHINE: NEGATIVE
POC (BZO) BENZODIAZEPINES: NEGATIVE
POC (COC) COCAINE: NEGATIVE
POC (MDMA) METHYLENEDIOXYMETHAMPHETAMINE 3,4: NEGATIVE
POC (MET) METHAMPHETAMINE: NEGATIVE
POC (MOP) OPIATES: NEGATIVE
POC (MTD) METHADONE: NEGATIVE
POC (OXY) OXYCODONE: NEGATIVE
POC (PCP) PHENCYCLIDINE: NEGATIVE
POC (TCA) TRICYCLIC ANTIDEPRESSANTS: NEGATIVE
POC TEMPERATURE (URINE): 92
POC THC: NEGATIVE

## 2023-09-27 PROCEDURE — 80305 POCT URINE DRUG SCREEN PRESUMP: ICD-10-PCS | Mod: QW,,, | Performed by: FAMILY MEDICINE

## 2023-09-27 PROCEDURE — 99395 PREV VISIT EST AGE 18-39: CPT | Mod: ,,, | Performed by: FAMILY MEDICINE

## 2023-09-27 PROCEDURE — 80305 DRUG TEST PRSMV DIR OPT OBS: CPT | Mod: QW,,, | Performed by: FAMILY MEDICINE

## 2023-09-27 PROCEDURE — 99395 PR PREVENTIVE VISIT,EST,18-39: ICD-10-PCS | Mod: ,,, | Performed by: FAMILY MEDICINE

## 2023-09-27 RX ORDER — DEXTROAMPHETAMINE SACCHARATE, AMPHETAMINE ASPARTATE, DEXTROAMPHETAMINE SULFATE AND AMPHETAMINE SULFATE 7.5; 7.5; 7.5; 7.5 MG/1; MG/1; MG/1; MG/1
1 TABLET ORAL DAILY
Qty: 30 TABLET | Refills: 0 | Status: SHIPPED | OUTPATIENT
Start: 2023-09-27 | End: 2023-10-27

## 2023-09-27 NOTE — PROGRESS NOTES
Lauren Mitchell is a 33 y.o. female seen today for her  wellness.  Patient is not fasting today but will come in for fasting lab work in the next week or so.  Patient is doing well no chest pain or shortness a breath but does have occasional palpitations but sees Cardiology annually with no serious heart problems.  She reports she is tolerating the Adderall well with no side effects no palpitations chest pain shortness a breath or weight loss associated with his drug.  Her  was appropriate and urine drug screen today only showed her prescribed amphetamine.  She is had no signs or symptoms of tolerance or addiction.  Today, she did defer the flu vaccine.      History reviewed. No pertinent past medical history.  Family History   Problem Relation Age of Onset    Diabetes Father     Mitral valve prolapse Maternal Uncle      Current Outpatient Medications on File Prior to Visit   Medication Sig Dispense Refill    aspirin-caffeine (ANACIN) 400-32 mg Tab       azelastine (ASTELIN) 137 mcg (0.1 %) nasal spray 2 sprays (274 mcg total) by Nasal route 2 (two) times daily. 90 mL 1    cetirizine-pseudoephedrine 5-120 mg Tb12 Take 1 tablet by mouth Daily. 30 tablet 2    cholecalciferol, vitamin D3, 125 mcg (5,000 unit) Tab Take 5,000 Units by mouth.      cyanocobalamin, vitamin B-12, (CYANOCOBALAMIN,VIT B-12,,BULK,) Powd Take by mouth.      fluticasone propionate (FLONASE) 50 mcg/actuation nasal spray 1 spray (50 mcg total) by Each Nostril route once daily. 16 g 2    iron,carbonyl-vitamin C (VITRON-C) 65 mg iron- 125 mg TbEC       magnesium 250 mg Tab       promethazine (PHENERGAN) 25 MG tablet       pyridoxine, vitamin B6, (B-6) 100 MG Tab       sumatriptan (IMITREX) 50 MG tablet       topiramate (TOPAMAX) 50 MG tablet Take 1 tablet (50 mg total) by mouth 2 (two) times daily. 180 tablet 1    valACYclovir (VALTREX) 500 MG tablet Take 1 tablet (500 mg total) by mouth 3 (three) times daily. for 7 days 21 tablet 0     [DISCONTINUED] dextroamphetamine-amphetamine 30 mg Tab Take 1 tablet (30 mg total) by mouth once daily. 30 tablet 0     No current facility-administered medications on file prior to visit.       There is no immunization history on file for this patient.    Review of Systems   Constitutional:  Negative for fever, malaise/fatigue and weight loss.   Respiratory:  Negative for shortness of breath.    Cardiovascular:  Negative for chest pain and palpitations.   Gastrointestinal:  Negative for nausea and vomiting.   Psychiatric/Behavioral:  Negative for depression.         Vitals:    09/27/23 0933   BP: 130/84   Pulse: 100   Resp: 18   Temp: 98.2 °F (36.8 °C)       Physical Exam  Vitals reviewed.   Constitutional:       Appearance: Normal appearance.   HENT:      Head: Normocephalic.   Eyes:      Extraocular Movements: Extraocular movements intact.      Conjunctiva/sclera: Conjunctivae normal.      Pupils: Pupils are equal, round, and reactive to light.   Neck:      Thyroid: No thyroid mass or thyromegaly.   Cardiovascular:      Rate and Rhythm: Normal rate and regular rhythm.      Heart sounds: Normal heart sounds. No murmur heard.     No gallop.   Pulmonary:      Effort: Pulmonary effort is normal. No respiratory distress.      Breath sounds: Normal breath sounds. No wheezing or rales.   Skin:     General: Skin is warm and dry.      Coloration: Skin is not jaundiced or pale.   Neurological:      Mental Status: She is alert.   Psychiatric:         Mood and Affect: Mood normal.         Behavior: Behavior normal.         Thought Content: Thought content normal.         Judgment: Judgment normal.          Assessment and Plan  Routine general medical examination at a health care facility    Long-term use of high-risk medication  -     POCT Urine Drug Screen Presump    Adult attention deficit disorder  -     dextroamphetamine-amphetamine 30 mg Tab; Take 1 tablet (30 mg total) by mouth once daily.  Dispense: 30 tablet; Refill:  0    Screening for diabetes mellitus  -     Glucose, Fasting; Future; Expected date: 09/27/2023    Screening for lipoid disorders  -     Lipid Panel; Future; Expected date: 09/27/2023            Return to clinic in 3 months.    The patient has no Health Maintenance topics of status Not Due

## 2023-10-04 LAB
CHOLEST SERPL-MCNC: 137 MG/DL (ref 0–200)
CHOLEST/HDLC SERPL: 2.9 {RATIO}
GLUCOSE SERPL-MCNC: 84 MG/DL (ref 74–106)
HDLC SERPL-MCNC: 48 MG/DL (ref 40–60)
LDLC SERPL CALC-MCNC: 76 MG/DL
LDLC/HDLC SERPL: 1.6 {RATIO}
NONHDLC SERPL-MCNC: 89 MG/DL
TRIGL SERPL-MCNC: 63 MG/DL (ref 35–150)
VLDLC SERPL-MCNC: 13 MG/DL

## 2023-10-04 PROCEDURE — 82947 ASSAY GLUCOSE BLOOD QUANT: CPT | Mod: ,,, | Performed by: CLINICAL MEDICAL LABORATORY

## 2023-10-04 PROCEDURE — 82947 GLUCOSE, FASTING: ICD-10-PCS | Mod: ,,, | Performed by: CLINICAL MEDICAL LABORATORY

## 2023-10-04 PROCEDURE — 80061 LIPID PANEL: ICD-10-PCS | Mod: ,,, | Performed by: CLINICAL MEDICAL LABORATORY

## 2023-10-04 PROCEDURE — 80061 LIPID PANEL: CPT | Mod: ,,, | Performed by: CLINICAL MEDICAL LABORATORY

## 2023-10-05 ENCOUNTER — TELEPHONE (OUTPATIENT)
Dept: FAMILY MEDICINE | Facility: CLINIC | Age: 33
End: 2023-10-05
Payer: OTHER GOVERNMENT

## 2023-10-05 NOTE — TELEPHONE ENCOUNTER
----- Message from Sanjay Walton MD sent at 10/5/2023  7:52 AM CDT -----  Good lipid panel and glucose.

## 2023-11-15 ENCOUNTER — PATIENT MESSAGE (OUTPATIENT)
Dept: ADMINISTRATIVE | Facility: HOSPITAL | Age: 33
End: 2023-11-15

## 2023-11-22 ENCOUNTER — PATIENT OUTREACH (OUTPATIENT)
Dept: ADMINISTRATIVE | Facility: HOSPITAL | Age: 33
End: 2023-11-22

## 2023-11-22 NOTE — PROGRESS NOTES
Health maintenance record review for population health care gaps  Noted to upcoming appointment with pcp;  3 month follow up*Cervical Cancer Screen vaccines and labs due for compliance*     Lauren Mitchell  [employer] User7 days ago       Patient Questionnaire Submission  --------------------------------     Questionnaire: Preventative Care Screenings     Question: Would you like help scheduling your screenings?  Answer:   No     Question: Have you completed any of these tests at a non-[facility] location recently?  Answer:   Yes     Question: Tell us the test and location it was done.  Answer:   Pap smear/[facility] complete 10/2023       [employer] User  Lauren Mitchell7 days ago     CI  Dear Ms. Mitchell,     Regular health screenings are one of the most important things you can do for your health. These medical tests help find problems before they start.     You are due for the health screening(s) below:      Due Now  Topic Last Completed Next Steps   Cervical Cancer Screening

## 2023-12-14 ENCOUNTER — TELEPHONE (OUTPATIENT)
Dept: CARDIOLOGY | Facility: CLINIC | Age: 33
End: 2023-12-14
Payer: OTHER GOVERNMENT

## 2023-12-14 NOTE — TELEPHONE ENCOUNTER
Called patient after speaking with Dr. Sree castro if OBGYN needs to look out for any cardiac issues due to patient being 9 weeks pregnant. Let patient know that he said there is nothing needed to look for and to contact our office if any issues do arise in the future. Patient verbalized understanding.   -HW

## 2024-03-12 ENCOUNTER — OFFICE VISIT (OUTPATIENT)
Dept: CARDIOLOGY | Facility: CLINIC | Age: 34
End: 2024-03-12
Payer: OTHER GOVERNMENT

## 2024-03-12 VITALS
DIASTOLIC BLOOD PRESSURE: 80 MMHG | WEIGHT: 161 LBS | HEART RATE: 90 BPM | BODY MASS INDEX: 28.53 KG/M2 | HEIGHT: 63 IN | SYSTOLIC BLOOD PRESSURE: 102 MMHG | OXYGEN SATURATION: 98 %

## 2024-03-12 DIAGNOSIS — R00.2 INTERMITTENT PALPITATIONS: ICD-10-CM

## 2024-03-12 DIAGNOSIS — F90.2 ADHD (ATTENTION DEFICIT HYPERACTIVITY DISORDER), COMBINED TYPE: ICD-10-CM

## 2024-03-12 DIAGNOSIS — R94.31 NONSPECIFIC ABNORMAL ELECTROCARDIOGRAM (ECG) (EKG): Primary | ICD-10-CM

## 2024-03-12 DIAGNOSIS — I95.1 ORTHOSTATIC HYPOTENSION: ICD-10-CM

## 2024-03-12 PROCEDURE — 99214 OFFICE O/P EST MOD 30 MIN: CPT | Mod: PBBFAC | Performed by: INTERNAL MEDICINE

## 2024-03-12 PROCEDURE — 99214 OFFICE O/P EST MOD 30 MIN: CPT | Mod: S$PBB,,, | Performed by: INTERNAL MEDICINE

## 2024-03-12 PROCEDURE — 93005 ELECTROCARDIOGRAM TRACING: CPT | Mod: PBBFAC | Performed by: INTERNAL MEDICINE

## 2024-03-12 PROCEDURE — 93010 ELECTROCARDIOGRAM REPORT: CPT | Mod: S$PBB,,, | Performed by: INTERNAL MEDICINE

## 2024-03-12 NOTE — PROGRESS NOTES
Cardiology Clinic Note:    PCP: Sanjay Walton MD    REFERRING PHYSICIAN: Sanjay Walton MD    CHIEF COMPLAINT:   Chief Complaint   Patient presents with    Follow-up     1 year    Palpitations     Flutters sometimes         HISTORY OF PRESENT ILLNESS:  Lauren Mitchell is a 34 y.o. female who presents for evaluation of palpitations.     Patient states palpitations have almost resolved, has experienced occasional heart skip, up to five beats, occurs once a day, once every other week.  She is 22 weeks into pregnancy, going well so far, no complications. .  She has an occasional skip, comes and goes spontaneously, does not interfer with normal activities of daily living.  She denies chest pain, pressure, tightness or shortness of breath.           Review of Systems   Constitutional: Negative for diaphoresis, malaise/fatigue, night sweats and weight gain.   HENT:  Negative for congestion, ear pain, hearing loss, nosebleeds and sore throat.    Eyes:  Negative for blurred vision, double vision, pain, photophobia and visual disturbance.   Cardiovascular:  Positive for palpitations. Negative for chest pain, claudication, dyspnea on exertion, irregular heartbeat, leg swelling, near-syncope, orthopnea and syncope.   Respiratory:  Positive for shortness of breath. Negative for cough, sleep disturbances due to breathing, snoring and wheezing.    Endocrine: Negative for cold intolerance, heat intolerance, polydipsia, polyphagia and polyuria.   Hematologic/Lymphatic: Negative for bleeding problem. Does not bruise/bleed easily.   Skin:  Negative for dry skin, flushing, itching, rash and skin cancer.   Musculoskeletal:  Negative for arthritis, back pain, falls, joint pain, muscle cramps, muscle weakness and myalgias.   Gastrointestinal:  Negative for abdominal pain, change in bowel habit, constipation, diarrhea, dysphagia, heartburn, nausea and vomiting.   Genitourinary:  Negative for bladder incontinence, dysuria, flank  pain, frequency and nocturia.   Neurological:  Positive for dizziness and headaches. Negative for focal weakness, light-headedness, loss of balance, numbness, paresthesias and seizures.   Psychiatric/Behavioral:  Positive for depression. Negative for memory loss and substance abuse. The patient is nervous/anxious.    Allergic/Immunologic: Positive for environmental allergies.        PAST MEDICAL HISTORY:  History reviewed. No pertinent past medical history.    PAST SURGICAL HISTORY:  History reviewed. No pertinent surgical history.    SOCIAL HISTORY:  Social History     Socioeconomic History    Marital status:    Tobacco Use    Smoking status: Never     Passive exposure: Past    Smokeless tobacco: Never   Substance and Sexual Activity    Alcohol use: Yes     Comment: occ    Drug use: Never    Sexual activity: Yes       FAMILY HISTORY:  Family History   Problem Relation Age of Onset    Diabetes Father     Mitral valve prolapse Maternal Uncle        ALLERGIES:  Allergies as of 03/12/2024 - Reviewed 03/12/2024   Allergen Reaction Noted    Bupropion Other (See Comments) 08/29/2019    Doxycycline Other (See Comments) 08/29/2019    Latex Other (See Comments) 08/29/2019    Penicillins Rash 12/06/2012         MEDICATIONS:  Current Outpatient Medications on File Prior to Visit   Medication Sig Dispense Refill    aspirin-caffeine (ANACIN) 400-32 mg Tab       azelastine (ASTELIN) 137 mcg (0.1 %) nasal spray 2 sprays (274 mcg total) by Nasal route 2 (two) times daily. (Patient not taking: Reported on 3/12/2024) 90 mL 1    cetirizine-pseudoephedrine 5-120 mg Tb12 Take 1 tablet by mouth Daily. 30 tablet 2    cholecalciferol, vitamin D3, 125 mcg (5,000 unit) Tab Take 5,000 Units by mouth.      cyanocobalamin, vitamin B-12, (CYANOCOBALAMIN,VIT B-12,,BULK,) Powd Take by mouth.      dextroamphetamine-amphetamine 30 mg Tab Take 1 tablet (30 mg total) by mouth once daily. (Patient not taking: Reported on 3/12/2024) 30 tablet 0  "   fluticasone propionate (FLONASE) 50 mcg/actuation nasal spray 1 spray (50 mcg total) by Each Nostril route once daily. (Patient not taking: Reported on 3/12/2024) 16 g 2    iron,carbonyl-vitamin C (VITRON-C) 65 mg iron- 125 mg TbEC       magnesium 250 mg Tab       promethazine (PHENERGAN) 25 MG tablet       pyridoxine, vitamin B6, (B-6) 100 MG Tab       sumatriptan (IMITREX) 50 MG tablet       topiramate (TOPAMAX) 50 MG tablet Take 1 tablet (50 mg total) by mouth 2 (two) times daily. (Patient not taking: Reported on 3/12/2024) 180 tablet 1    valACYclovir (VALTREX) 500 MG tablet Take 1 tablet (500 mg total) by mouth 3 (three) times daily. for 7 days (Patient not taking: Reported on 3/12/2024) 21 tablet 0     No current facility-administered medications on file prior to visit.          PHYSICAL EXAM:  Blood pressure 102/80, pulse 90, height 5' 3" (1.6 m), weight 73 kg (161 lb), SpO2 98 %.  Wt Readings from Last 3 Encounters:   03/12/24 73 kg (161 lb)   09/27/23 59.4 kg (131 lb)   07/20/23 60.8 kg (134 lb)      Body mass index is 28.52 kg/m².    Physical Exam  Vitals and nursing note reviewed.   Constitutional:       Appearance: Normal appearance. She is normal weight.   HENT:      Head: Normocephalic and atraumatic.      Right Ear: External ear normal.      Left Ear: External ear normal.   Eyes:      General: No scleral icterus.        Right eye: No discharge.         Left eye: No discharge.      Extraocular Movements: Extraocular movements intact.      Conjunctiva/sclera: Conjunctivae normal.      Pupils: Pupils are equal, round, and reactive to light.   Cardiovascular:      Rate and Rhythm: Normal rate and regular rhythm.      Pulses: Normal pulses.      Heart sounds: Normal heart sounds. No murmur heard.     No friction rub. No gallop.   Pulmonary:      Effort: Pulmonary effort is normal.      Breath sounds: Normal breath sounds. No wheezing, rhonchi or rales.   Chest:      Chest wall: No tenderness. " "  Abdominal:      General: Abdomen is flat. Bowel sounds are normal. There is no distension.      Palpations: Abdomen is soft.      Tenderness: There is no abdominal tenderness. There is no guarding or rebound.   Musculoskeletal:         General: No swelling or tenderness. Normal range of motion.      Cervical back: Normal range of motion and neck supple.   Skin:     General: Skin is warm and dry.      Findings: No erythema or rash.   Neurological:      General: No focal deficit present.      Mental Status: She is alert and oriented to person, place, and time.      Cranial Nerves: No cranial nerve deficit.      Motor: No weakness.      Gait: Gait normal.   Psychiatric:         Mood and Affect: Mood normal.         Behavior: Behavior normal.         Thought Content: Thought content normal.         Judgment: Judgment normal.        LABS REVIEWED:  No results found for: "WBC", "RBC", "HGB", "HCT", "MCV", "MCH", "MCHC", "RDW", "PLT", "MPV", "NRBC", "INR"  No results found for: "NA", "K", "CL", "CO2", "BUN", "MG", "PHOS"  No results found for: "CPK", "AST", "ALT"  No results found for: "GLU", "HGBA1C"  Lab Results   Component Value Date    CHOL 137 10/04/2023    HDL 48 10/04/2023    TRIG 63 10/04/2023    CHOLHDL 2.9 10/04/2023       CARDIAC STUDIES REVIEWED:  EKG  12/29/21 -   Normal sinus rhythm.  Rightward axis.  Septal infarct ,age undetermined   ECHO /1/11/22 -  The left ventricle is normal in size with normal systolic function.   EF 55%    OTHER IMAGING STUDIES REVIEWED:    ASSESSMENT: PLAN:    1.  Orthostatic hypotension, has improved, continues management with careful positional changes, encourage increase exercise, goal is  walk 30 min daily, 5 days weekly.   2.  ADHD, on Adderall x 9-10 yrs, off adderal during pregnancy, tolerating well.    3.  Migraines - much less frequent,  followed by neurology in [place].  Keep diary of headaches.   4. Palpitations, benign PVCs, rare, not affecting daily living.    Follow " up in one year, sooner if symptoms change.

## 2024-03-26 LAB
OHS QRS DURATION: 82 MS
OHS QTC CALCULATION: 409 MS

## 2024-08-21 ENCOUNTER — OFFICE VISIT (OUTPATIENT)
Dept: FAMILY MEDICINE | Facility: CLINIC | Age: 34
End: 2024-08-21
Payer: OTHER GOVERNMENT

## 2024-08-21 VITALS
BODY MASS INDEX: 32.78 KG/M2 | OXYGEN SATURATION: 99 % | TEMPERATURE: 98 F | SYSTOLIC BLOOD PRESSURE: 134 MMHG | RESPIRATION RATE: 18 BRPM | HEART RATE: 73 BPM | DIASTOLIC BLOOD PRESSURE: 86 MMHG | WEIGHT: 185 LBS | HEIGHT: 63 IN

## 2024-08-21 DIAGNOSIS — Z79.899 LONG-TERM USE OF HIGH-RISK MEDICATION: Primary | ICD-10-CM

## 2024-08-21 DIAGNOSIS — F98.8 ADULT ATTENTION DEFICIT DISORDER: ICD-10-CM

## 2024-08-21 DIAGNOSIS — G43.709 CHRONIC MIGRAINE WITHOUT AURA WITHOUT STATUS MIGRAINOSUS, NOT INTRACTABLE: ICD-10-CM

## 2024-08-21 LAB

## 2024-08-21 PROCEDURE — 99213 OFFICE O/P EST LOW 20 MIN: CPT | Mod: ,,, | Performed by: FAMILY MEDICINE

## 2024-08-21 PROCEDURE — 80305 DRUG TEST PRSMV DIR OPT OBS: CPT | Mod: QW,,, | Performed by: FAMILY MEDICINE

## 2024-08-21 RX ORDER — TOPIRAMATE 50 MG/1
50 TABLET, FILM COATED ORAL 2 TIMES DAILY
Qty: 180 TABLET | Refills: 1 | Status: SHIPPED | OUTPATIENT
Start: 2024-08-21

## 2024-08-21 RX ORDER — DEXTROAMPHETAMINE SACCHARATE, AMPHETAMINE ASPARTATE, DEXTROAMPHETAMINE SULFATE AND AMPHETAMINE SULFATE 7.5; 7.5; 7.5; 7.5 MG/1; MG/1; MG/1; MG/1
1 TABLET ORAL DAILY
Qty: 30 TABLET | Refills: 0 | Status: SHIPPED | OUTPATIENT
Start: 2024-08-21 | End: 2024-09-20

## 2024-08-21 RX ORDER — DEXTROAMPHETAMINE SACCHARATE, AMPHETAMINE ASPARTATE, DEXTROAMPHETAMINE SULFATE AND AMPHETAMINE SULFATE 7.5; 7.5; 7.5; 7.5 MG/1; MG/1; MG/1; MG/1
1 TABLET ORAL DAILY
Qty: 30 TABLET | Refills: 0 | Status: SHIPPED | OUTPATIENT
Start: 2024-08-21 | End: 2024-08-21

## 2024-08-21 NOTE — Clinical Note
Discussed care gaps with pt. Pt defers vaccines at this time. Pt reports had last pap last October with Dr Callahan. Pt defers HepC/HIV screening at this time

## 2024-08-21 NOTE — PROGRESS NOTES
Lauren Mitchell is a 34 y.o. female seen today for restart her Adderall after recent pregnancy.  She is doing well with no chest pain or shortness a breath..  She has had no medication side effects and reports the Adderall is working well she is meeting her ADLs and has had no signs or symptoms of tolerance or addiction.  Her  today was appropriate and today's urine drug screen was completely negative as we would expect since she has been off the medication.      History reviewed. No pertinent past medical history.  Family History   Problem Relation Name Age of Onset    Diabetes Father      Mitral valve prolapse Maternal Uncle       Current Outpatient Medications on File Prior to Visit   Medication Sig Dispense Refill    cholecalciferol, vitamin D3, 125 mcg (5,000 unit) Tab Take 5,000 Units by mouth.      cyanocobalamin, vitamin B-12, (CYANOCOBALAMIN,VIT B-12,,BULK,) Powd Take by mouth.      iron,carbonyl-vitamin C (VITRON-C) 65 mg iron- 125 mg TbEC       magnesium 250 mg Tab       pyridoxine, vitamin B6, (B-6) 100 MG Tab       [DISCONTINUED] aspirin-caffeine (ANACIN) 400-32 mg Tab  (Patient not taking: Reported on 8/21/2024)      [DISCONTINUED] azelastine (ASTELIN) 137 mcg (0.1 %) nasal spray 2 sprays (274 mcg total) by Nasal route 2 (two) times daily. 90 mL 1    [DISCONTINUED] cetirizine-pseudoephedrine 5-120 mg Tb12 Take 1 tablet by mouth Daily. 30 tablet 2    [DISCONTINUED] dextroamphetamine-amphetamine 30 mg Tab Take 1 tablet (30 mg total) by mouth once daily. (Patient not taking: Reported on 3/12/2024) 30 tablet 0    [DISCONTINUED] fluticasone propionate (FLONASE) 50 mcg/actuation nasal spray 1 spray (50 mcg total) by Each Nostril route once daily. (Patient not taking: Reported on 3/12/2024) 16 g 2    [DISCONTINUED] promethazine (PHENERGAN) 25 MG tablet       [DISCONTINUED] sumatriptan (IMITREX) 50 MG tablet       [DISCONTINUED] topiramate (TOPAMAX) 50 MG tablet Take 1 tablet (50 mg total) by mouth 2 (two)  times daily. (Patient not taking: Reported on 3/12/2024) 180 tablet 1    [DISCONTINUED] valACYclovir (VALTREX) 500 MG tablet Take 1 tablet (500 mg total) by mouth 3 (three) times daily. for 7 days 21 tablet 0     No current facility-administered medications on file prior to visit.       There is no immunization history on file for this patient.    Review of Systems   HENT:  Negative for hearing loss.    Eyes:  Negative for discharge.   Respiratory:  Negative for wheezing.    Cardiovascular:  Negative for chest pain and palpitations.   Gastrointestinal:  Negative for blood in stool, constipation, diarrhea and vomiting.   Genitourinary:  Negative for dysuria and hematuria.   Musculoskeletal:  Negative for neck pain.   Neurological:  Positive for headaches. Negative for weakness.   Endo/Heme/Allergies:  Negative for polydipsia.        Vitals:    08/21/24 1030   BP: 134/86   Pulse: 73   Resp: 18   Temp: 98.2 °F (36.8 °C)       Physical Exam  Vitals reviewed.   Constitutional:       Appearance: Normal appearance.   HENT:      Head: Normocephalic.   Eyes:      Extraocular Movements: Extraocular movements intact.      Conjunctiva/sclera: Conjunctivae normal.      Pupils: Pupils are equal, round, and reactive to light.   Neck:      Thyroid: No thyroid mass or thyromegaly.   Cardiovascular:      Rate and Rhythm: Normal rate and regular rhythm.      Heart sounds: Normal heart sounds. No murmur heard.     No gallop.   Pulmonary:      Effort: Pulmonary effort is normal. No respiratory distress.      Breath sounds: Normal breath sounds. No wheezing or rales.   Skin:     General: Skin is warm and dry.      Coloration: Skin is not jaundiced or pale.   Neurological:      Mental Status: She is alert.   Psychiatric:         Mood and Affect: Mood normal.         Behavior: Behavior normal.         Thought Content: Thought content normal.         Judgment: Judgment normal.          Assessment and Plan  1. Long-term use of high-risk  medication  -     POCT Urine Drug Screen Presump    2. Adult attention deficit disorder  -     Discontinue: dextroamphetamine-amphetamine 30 mg Tab; Take 1 tablet (30 mg total) by mouth once daily.  Dispense: 30 tablet; Refill: 0  -     Discontinue: dextroamphetamine-amphetamine 30 mg Tab; Take 1 tablet (30 mg total) by mouth once daily.  Dispense: 30 tablet; Refill: 0  -     dextroamphetamine-amphetamine 30 mg Tab; Take 1 tablet (30 mg total) by mouth once daily.  Dispense: 30 tablet; Refill: 0    3. Chronic migraine without aura without status migrainosus, not intractable  -     topiramate (TOPAMAX) 50 MG tablet; Take 1 tablet (50 mg total) by mouth 2 (two) times daily.  Dispense: 180 tablet; Refill: 1             Return to clinic in 3 months or as needed.    Health Maintenance Topics with due status: Not Due       Topic Last Completion Date    Influenza Vaccine 12/28/2022    TETANUS VACCINE 06/19/2024         Answers submitted by the patient for this visit:  Review of Systems Questionnaire (Submitted on 8/5/2024)  activity change: No  unexpected weight change: No  rhinorrhea: No  trouble swallowing: No  visual disturbance: No  chest tightness: No  polyuria: No  difficulty urinating: No  menstrual problem: No  joint swelling: No  arthralgias: No  confusion: No  dysphoric mood: No

## 2024-10-28 ENCOUNTER — OFFICE VISIT (OUTPATIENT)
Dept: FAMILY MEDICINE | Facility: CLINIC | Age: 34
End: 2024-10-28
Payer: OTHER GOVERNMENT

## 2024-10-28 VITALS
HEIGHT: 63 IN | HEART RATE: 97 BPM | OXYGEN SATURATION: 100 % | TEMPERATURE: 99 F | SYSTOLIC BLOOD PRESSURE: 111 MMHG | DIASTOLIC BLOOD PRESSURE: 80 MMHG | BODY MASS INDEX: 32.77 KG/M2 | RESPIRATION RATE: 15 BRPM

## 2024-10-28 DIAGNOSIS — J02.9 SORE THROAT: ICD-10-CM

## 2024-10-28 DIAGNOSIS — J02.0 STREP THROAT: Primary | ICD-10-CM

## 2024-10-28 LAB
CTP QC/QA: YES
MOLECULAR STREP A: POSITIVE

## 2024-10-28 PROCEDURE — 99212 OFFICE O/P EST SF 10 MIN: CPT | Mod: ,,, | Performed by: NURSE PRACTITIONER

## 2024-10-28 RX ORDER — AZITHROMYCIN 250 MG/1
TABLET, FILM COATED ORAL
Qty: 6 TABLET | Refills: 0 | Status: SHIPPED | OUTPATIENT
Start: 2024-10-28

## 2024-10-28 RX ORDER — CETIRIZINE HYDROCHLORIDE, PSEUDOEPHEDRINE HYDROCHLORIDE 5; 120 MG/1; MG/1
1 TABLET, FILM COATED, EXTENDED RELEASE ORAL DAILY
COMMUNITY

## 2024-11-14 ENCOUNTER — OFFICE VISIT (OUTPATIENT)
Dept: FAMILY MEDICINE | Facility: CLINIC | Age: 34
End: 2024-11-14
Payer: OTHER GOVERNMENT

## 2024-11-14 VITALS — TEMPERATURE: 99 F

## 2024-11-14 DIAGNOSIS — J02.9 SORE THROAT: ICD-10-CM

## 2024-11-14 DIAGNOSIS — Z11.52 ENCOUNTER FOR SCREENING FOR COVID-19: ICD-10-CM

## 2024-11-14 DIAGNOSIS — J02.0 STREP PHARYNGITIS: Primary | ICD-10-CM

## 2024-11-14 LAB
CTP QC/QA: YES
MOLECULAR STREP A: POSITIVE
POC MOLECULAR INFLUENZA A AGN: NEGATIVE
POC MOLECULAR INFLUENZA B AGN: NEGATIVE
SARS-COV-2 RDRP RESP QL NAA+PROBE: NEGATIVE

## 2024-11-14 RX ORDER — CEFUROXIME AXETIL 500 MG/1
500 TABLET ORAL EVERY 12 HOURS
Qty: 20 TABLET | Refills: 0 | Status: SHIPPED | OUTPATIENT
Start: 2024-11-14

## 2024-11-14 NOTE — PROGRESS NOTES
Lauren Mitchell is a 34 y.o. female seen today for a several day history of worsening sore throat.  So far she has been afebrile but recently treated via Z-Jovani for strep pharyngitis.  She feels as if the condition has returned.  The patient has tolerated cephalosporins such as Rocephin and Ceftin in the past without a rash.  Her COVID and flu testing were negative today but her strep was positive.    History reviewed. No pertinent past medical history.  Family History   Problem Relation Name Age of Onset    Diabetes Father      Mitral valve prolapse Maternal Uncle       Current Outpatient Medications on File Prior to Visit   Medication Sig Dispense Refill    azithromycin (Z-JOVANI) 250 MG tablet Take 2 tablets by mouth on day 1; Take 1 tablet by mouth on days 2-5 6 tablet 0    cetirizine-pseudoephedrine 5-120 mg Tb12 Take 1 tablet by mouth once daily.      cholecalciferol, vitamin D3, 125 mcg (5,000 unit) Tab Take 5,000 Units by mouth.      cyanocobalamin, vitamin B-12, (CYANOCOBALAMIN,VIT B-12,,BULK,) Powd Take by mouth.      dextroamphetamine-amphetamine 30 mg Tab Take 1 tablet (30 mg total) by mouth once daily. 30 tablet 0    iron,carbonyl-vitamin C (VITRON-C) 65 mg iron- 125 mg TbEC       magnesium 250 mg Tab       pyridoxine, vitamin B6, (B-6) 100 MG Tab       topiramate (TOPAMAX) 50 MG tablet Take 1 tablet (50 mg total) by mouth 2 (two) times daily. 180 tablet 1     No current facility-administered medications on file prior to visit.       There is no immunization history on file for this patient.    Review of Systems   HENT:  Positive for sore throat. Negative for congestion, ear discharge and ear pain.    Respiratory:  Negative for cough, shortness of breath and stridor.    Gastrointestinal:  Negative for abdominal pain, diarrhea and vomiting.   Musculoskeletal:  Negative for neck pain.   Neurological:  Positive for headaches.        Vitals:    11/14/24 0834   Temp: 98.5 °F (36.9 °C)       Physical Exam  Vitals  reviewed.   Constitutional:       Appearance: Normal appearance.   HENT:      Head: Normocephalic.      Mouth/Throat:      Pharynx: Pharyngeal swelling, oropharyngeal exudate, posterior oropharyngeal erythema and uvula swelling present.   Eyes:      Extraocular Movements: Extraocular movements intact.      Conjunctiva/sclera: Conjunctivae normal.      Pupils: Pupils are equal, round, and reactive to light.   Neck:      Thyroid: No thyroid mass or thyromegaly.      Comments: Shoddy lymphadenopathy.  Cardiovascular:      Rate and Rhythm: Normal rate and regular rhythm.      Heart sounds: Normal heart sounds. No murmur heard.     No gallop.   Pulmonary:      Effort: Pulmonary effort is normal. No respiratory distress.      Breath sounds: Normal breath sounds. No wheezing or rales.   Lymphadenopathy:      Cervical: Cervical adenopathy present.      Right cervical: Superficial cervical adenopathy present.   Skin:     General: Skin is warm and dry.      Coloration: Skin is not jaundiced or pale.   Neurological:      Mental Status: She is alert.   Psychiatric:         Mood and Affect: Mood normal.         Behavior: Behavior normal.         Thought Content: Thought content normal.         Judgment: Judgment normal.          Assessment and Plan  1. Strep pharyngitis  -     cefUROXime (CEFTIN) 500 MG tablet; Take 1 tablet (500 mg total) by mouth every 12 (twelve) hours.  Dispense: 20 tablet; Refill: 0    2. Sore throat  -     POCT Influenza A/B Molecular  -     POCT Strep A, Molecular    3. Encounter for screening for COVID-19  -     POCT COVID-19 Rapid Screening             Return to clinic in 2 weeks for a lab only visit to recheck rapid strep.    Health Maintenance Topics with due status: Not Due       Topic Last Completion Date    TETANUS VACCINE 06/19/2024    RSV Vaccine (Age 60+ and Pregnant patients) Not Due         Answers submitted by the patient for this visit:  Sore Throat Questionnaire (Submitted on  11/13/2024)  Chief Complaint: Sore throat  Chronicity: recurrent  Onset: yesterday  Progression since onset: gradually worsening  Pain worse on: neither  Fever: no fever  Pain - numeric: 3/10  drooling: No  hoarse voice: No  plugged ear sensation: No  swollen glands: No  trouble swallowing: No  strep: Yes  Treatments tried: NSAIDs  Improvement on treatment: mild  Pain severity: mild

## 2024-11-21 ENCOUNTER — OFFICE VISIT (OUTPATIENT)
Dept: FAMILY MEDICINE | Facility: CLINIC | Age: 34
End: 2024-11-21
Payer: OTHER GOVERNMENT

## 2024-11-21 VITALS
WEIGHT: 177 LBS | HEART RATE: 91 BPM | BODY MASS INDEX: 31.36 KG/M2 | OXYGEN SATURATION: 100 % | HEIGHT: 63 IN | SYSTOLIC BLOOD PRESSURE: 114 MMHG | DIASTOLIC BLOOD PRESSURE: 80 MMHG | RESPIRATION RATE: 18 BRPM | TEMPERATURE: 98 F

## 2024-11-21 DIAGNOSIS — F98.8 ADULT ATTENTION DEFICIT DISORDER: Primary | ICD-10-CM

## 2024-11-21 DIAGNOSIS — Z79.899 LONG-TERM USE OF HIGH-RISK MEDICATION: ICD-10-CM

## 2024-11-21 PROCEDURE — 99213 OFFICE O/P EST LOW 20 MIN: CPT | Mod: ,,, | Performed by: FAMILY MEDICINE

## 2024-11-21 PROCEDURE — 80305 DRUG TEST PRSMV DIR OPT OBS: CPT | Mod: QW,,, | Performed by: FAMILY MEDICINE

## 2024-11-21 RX ORDER — DEXTROAMPHETAMINE SACCHARATE, AMPHETAMINE ASPARTATE, DEXTROAMPHETAMINE SULFATE AND AMPHETAMINE SULFATE 7.5; 7.5; 7.5; 7.5 MG/1; MG/1; MG/1; MG/1
1 TABLET ORAL DAILY
Qty: 30 TABLET | Refills: 0 | Status: SHIPPED | OUTPATIENT
Start: 2024-11-21 | End: 2024-11-21

## 2024-11-21 RX ORDER — DEXTROAMPHETAMINE SACCHARATE, AMPHETAMINE ASPARTATE, DEXTROAMPHETAMINE SULFATE AND AMPHETAMINE SULFATE 7.5; 7.5; 7.5; 7.5 MG/1; MG/1; MG/1; MG/1
1 TABLET ORAL DAILY
Qty: 30 TABLET | Refills: 0 | Status: SHIPPED | OUTPATIENT
Start: 2024-11-21 | End: 2024-12-21

## 2024-11-21 NOTE — PROGRESS NOTES
Lauren Mitchell is a 34 y.o. female seen today for follow-up on her attention deficit disorder.  She reports good symptom control with the Adderall with no side effects no palpitations no chest pain or shortness for breath.  She is still fatigued recovering from strep throat with 2-3 more doses left.  Since this was her 2nd round and the patient will follow up next week to make sure her symptoms have cleared.  Patient has had no signs or symptoms of tolerance or addiction and her  today was appropriate.  Today she urine drug screen only showed her prescribed amphetamine.      History reviewed. No pertinent past medical history.  Family History   Problem Relation Name Age of Onset    Diabetes Father      Mitral valve prolapse Maternal Uncle       Current Outpatient Medications on File Prior to Visit   Medication Sig Dispense Refill    cefUROXime (CEFTIN) 500 MG tablet Take 1 tablet (500 mg total) by mouth every 12 (twelve) hours. 20 tablet 0    cetirizine-pseudoephedrine 5-120 mg Tb12 Take 1 tablet by mouth once daily.      topiramate (TOPAMAX) 50 MG tablet Take 1 tablet (50 mg total) by mouth 2 (two) times daily. 180 tablet 1    [DISCONTINUED] azithromycin (Z-JOVANI) 250 MG tablet Take 2 tablets by mouth on day 1; Take 1 tablet by mouth on days 2-5 (Patient not taking: Reported on 11/21/2024) 6 tablet 0    [DISCONTINUED] cholecalciferol, vitamin D3, 125 mcg (5,000 unit) Tab Take 5,000 Units by mouth. (Patient not taking: Reported on 11/21/2024)      [DISCONTINUED] cyanocobalamin, vitamin B-12, (CYANOCOBALAMIN,VIT B-12,,BULK,) Powd Take by mouth. (Patient not taking: Reported on 11/21/2024)      [DISCONTINUED] dextroamphetamine-amphetamine 30 mg Tab Take 1 tablet (30 mg total) by mouth once daily. 30 tablet 0    [DISCONTINUED] iron,carbonyl-vitamin C (VITRON-C) 65 mg iron- 125 mg TbEC  (Patient not taking: Reported on 11/21/2024)      [DISCONTINUED] magnesium 250 mg Tab  (Patient not taking: Reported on 11/21/2024)       [DISCONTINUED] pyridoxine, vitamin B6, (B-6) 100 MG Tab  (Patient not taking: Reported on 11/21/2024)       No current facility-administered medications on file prior to visit.       There is no immunization history on file for this patient.    Review of Systems   Constitutional:  Positive for malaise/fatigue. Negative for fever and weight loss.   HENT:  Positive for congestion.    Respiratory:  Negative for shortness of breath.    Cardiovascular:  Negative for chest pain and palpitations.   Gastrointestinal:  Negative for nausea and vomiting.   Psychiatric/Behavioral:  Negative for depression.         Vitals:    11/21/24 0931   BP: 114/80   Pulse: 91   Resp: 18   Temp: 98.4 °F (36.9 °C)       Physical Exam  Vitals reviewed.   Constitutional:       Appearance: Normal appearance.   HENT:      Head: Normocephalic.      Nose:      Right Turbinates: Swollen.      Left Turbinates: Swollen.      Comments: Patient has erythema with copious postnasal drainage.  Eyes:      Extraocular Movements: Extraocular movements intact.      Conjunctiva/sclera: Conjunctivae normal.      Pupils: Pupils are equal, round, and reactive to light.   Neck:      Thyroid: No thyroid mass or thyromegaly.   Cardiovascular:      Rate and Rhythm: Normal rate and regular rhythm.      Heart sounds: Normal heart sounds. No murmur heard.     No gallop.   Pulmonary:      Effort: Pulmonary effort is normal. No respiratory distress.      Breath sounds: Normal breath sounds. No wheezing or rales.   Skin:     General: Skin is warm and dry.      Coloration: Skin is not jaundiced or pale.   Neurological:      Mental Status: She is alert.   Psychiatric:         Mood and Affect: Mood normal.         Behavior: Behavior normal.         Thought Content: Thought content normal.         Judgment: Judgment normal.          Assessment and Plan  1. Adult attention deficit disorder  -     Discontinue: dextroamphetamine-amphetamine 30 mg Tab; Take 1 tablet (30 mg total)  by mouth once daily.  Dispense: 30 tablet; Refill: 0  -     Discontinue: dextroamphetamine-amphetamine 30 mg Tab; Take 1 tablet (30 mg total) by mouth once daily.  Dispense: 30 tablet; Refill: 0  -     dextroamphetamine-amphetamine 30 mg Tab; Take 1 tablet (30 mg total) by mouth once daily.  Dispense: 30 tablet; Refill: 0    2. Long-term use of high-risk medication  -     POCT Urine Drug Screen Presump             Return to clinic in one-week for follow-up or as needed.  In the meantime I did recommend restarting her Flonase.    Health Maintenance Topics with due status: Not Due       Topic Last Completion Date    TETANUS VACCINE 06/19/2024    RSV Vaccine (Age 60+ and Pregnant patients) Not Due

## 2024-11-27 ENCOUNTER — CLINICAL SUPPORT (OUTPATIENT)
Dept: FAMILY MEDICINE | Facility: CLINIC | Age: 34
End: 2024-11-27
Payer: OTHER GOVERNMENT

## 2024-11-27 VITALS
TEMPERATURE: 98 F | OXYGEN SATURATION: 100 % | SYSTOLIC BLOOD PRESSURE: 125 MMHG | WEIGHT: 175.13 LBS | HEART RATE: 86 BPM | BODY MASS INDEX: 31.03 KG/M2 | DIASTOLIC BLOOD PRESSURE: 85 MMHG | HEIGHT: 63 IN | RESPIRATION RATE: 20 BRPM

## 2024-11-27 DIAGNOSIS — J02.9 SORE THROAT: Primary | ICD-10-CM

## 2025-01-27 ENCOUNTER — OFFICE VISIT (OUTPATIENT)
Dept: FAMILY MEDICINE | Facility: CLINIC | Age: 35
End: 2025-01-27
Payer: OTHER GOVERNMENT

## 2025-01-27 VITALS
WEIGHT: 173 LBS | DIASTOLIC BLOOD PRESSURE: 88 MMHG | HEART RATE: 98 BPM | TEMPERATURE: 99 F | HEIGHT: 63 IN | SYSTOLIC BLOOD PRESSURE: 132 MMHG | OXYGEN SATURATION: 99 % | RESPIRATION RATE: 18 BRPM | BODY MASS INDEX: 30.65 KG/M2

## 2025-01-27 DIAGNOSIS — J01.00 ACUTE NON-RECURRENT MAXILLARY SINUSITIS: Primary | ICD-10-CM

## 2025-01-27 DIAGNOSIS — Z20.828 EXPOSURE TO VIRAL DISEASE: ICD-10-CM

## 2025-01-27 DIAGNOSIS — Z11.52 ENCOUNTER FOR SCREENING FOR COVID-19: ICD-10-CM

## 2025-01-27 LAB
CTP QC/QA: YES
CTP QC/QA: YES
POC MOLECULAR INFLUENZA A AGN: NEGATIVE
POC MOLECULAR INFLUENZA B AGN: NEGATIVE
SARS-COV-2 RDRP RESP QL NAA+PROBE: NEGATIVE

## 2025-01-27 PROCEDURE — 96372 THER/PROPH/DIAG INJ SC/IM: CPT | Mod: ,,, | Performed by: FAMILY MEDICINE

## 2025-01-27 PROCEDURE — 87635 SARS-COV-2 COVID-19 AMP PRB: CPT | Mod: QW,,, | Performed by: FAMILY MEDICINE

## 2025-01-27 PROCEDURE — 99213 OFFICE O/P EST LOW 20 MIN: CPT | Mod: 25,,, | Performed by: FAMILY MEDICINE

## 2025-01-27 PROCEDURE — 87502 INFLUENZA DNA AMP PROBE: CPT | Mod: QW,,, | Performed by: FAMILY MEDICINE

## 2025-01-27 RX ORDER — BETAMETHASONE SODIUM PHOSPHATE AND BETAMETHASONE ACETATE 3; 3 MG/ML; MG/ML
6 INJECTION, SUSPENSION INTRA-ARTICULAR; INTRALESIONAL; INTRAMUSCULAR; SOFT TISSUE
Status: COMPLETED | OUTPATIENT
Start: 2025-01-27 | End: 2025-01-27

## 2025-01-27 RX ORDER — SULFAMETHOXAZOLE AND TRIMETHOPRIM 800; 160 MG/1; MG/1
1 TABLET ORAL 2 TIMES DAILY
Qty: 20 TABLET | Refills: 0 | Status: SHIPPED | OUTPATIENT
Start: 2025-01-27

## 2025-01-27 RX ADMIN — BETAMETHASONE SODIUM PHOSPHATE AND BETAMETHASONE ACETATE 6 MG: 3; 3 INJECTION, SUSPENSION INTRA-ARTICULAR; INTRALESIONAL; INTRAMUSCULAR; SOFT TISSUE at 04:01

## 2025-01-27 NOTE — PROGRESS NOTES
Lauren Mitchell is a 34 y.o. female seen today for a greater one-week history of nasal congestion postnasal drainage sinus pain and pressure and right-sided ear discomfort.  Her COVID flu testing were negative.  Currently the patient is on Zyrtec D and Flonase with no improvement.    History reviewed. No pertinent past medical history.  Family History   Problem Relation Name Age of Onset    Diabetes Father      Mitral valve prolapse Maternal Uncle       Current Outpatient Medications on File Prior to Visit   Medication Sig Dispense Refill    cetirizine-pseudoephedrine 5-120 mg Tb12 Take 1 tablet by mouth once daily.      topiramate (TOPAMAX) 50 MG tablet Take 1 tablet (50 mg total) by mouth 2 (two) times daily. 180 tablet 1    cefUROXime (CEFTIN) 500 MG tablet Take 1 tablet (500 mg total) by mouth every 12 (twelve) hours. 20 tablet 0    dextroamphetamine-amphetamine 30 mg Tab Take 1 tablet (30 mg total) by mouth once daily. 30 tablet 0     No current facility-administered medications on file prior to visit.       There is no immunization history on file for this patient.    Review of Systems   Constitutional:  Negative for fever, malaise/fatigue and weight loss.   HENT:  Positive for congestion and sinus pain. Negative for hearing loss.    Eyes:  Negative for discharge.   Respiratory:  Negative for shortness of breath and wheezing.    Cardiovascular:  Negative for chest pain and palpitations.   Gastrointestinal:  Negative for blood in stool, constipation, diarrhea, nausea and vomiting.   Genitourinary:  Negative for dysuria and hematuria.   Musculoskeletal:  Negative for neck pain.   Neurological:  Positive for headaches. Negative for weakness.   Endo/Heme/Allergies:  Negative for polydipsia.   Psychiatric/Behavioral:  Negative for depression.         Vitals:    01/27/25 1532   BP: 132/88   Pulse: 98   Resp: 18   Temp: 98.6 °F (37 °C)       Physical Exam  Vitals reviewed.   Constitutional:       Appearance: Normal  appearance.   HENT:      Head: Normocephalic.      Nose:      Right Turbinates: Swollen.      Left Turbinates: Swollen.      Right Sinus: Maxillary sinus tenderness present. No frontal sinus tenderness.      Left Sinus: Maxillary sinus tenderness present. No frontal sinus tenderness.      Mouth/Throat:      Pharynx: Posterior oropharyngeal erythema and postnasal drip present. No pharyngeal swelling, oropharyngeal exudate or uvula swelling.   Eyes:      Extraocular Movements: Extraocular movements intact.      Conjunctiva/sclera: Conjunctivae normal.      Pupils: Pupils are equal, round, and reactive to light.   Neck:      Thyroid: No thyroid mass or thyromegaly.   Cardiovascular:      Rate and Rhythm: Normal rate and regular rhythm.      Heart sounds: Normal heart sounds. No murmur heard.     No gallop.   Pulmonary:      Effort: Pulmonary effort is normal. No respiratory distress.      Breath sounds: Normal breath sounds. No wheezing or rales.   Skin:     General: Skin is warm and dry.      Coloration: Skin is not jaundiced or pale.   Neurological:      Mental Status: She is alert.   Psychiatric:         Mood and Affect: Mood normal.         Behavior: Behavior normal.         Thought Content: Thought content normal.         Judgment: Judgment normal.          Assessment and Plan  1. Acute non-recurrent maxillary sinusitis  -     sulfamethoxazole-trimethoprim 800-160mg (BACTRIM DS) 800-160 mg Tab; Take 1 tablet by mouth 2 (two) times daily.  Dispense: 20 tablet; Refill: 0  -     betamethasone acetate-betamethasone sodium phosphate injection 6 mg    2. Encounter for screening for COVID-19  -     POCT COVID-19 Rapid Screening    3. Exposure to viral disease  -     POCT Influenza A/B Molecular             Return to clinic if symptoms worsen or persist.    Health Maintenance Topics with due status: Not Due       Topic Last Completion Date    TETANUS VACCINE 06/19/2024    RSV Vaccine (Age 60+ and Pregnant patients) Not  Due         Answers submitted by the patient for this visit:  Review of Systems Questionnaire (Submitted on 1/26/2025)  activity change: No  unexpected weight change: No  rhinorrhea: Yes  trouble swallowing: No  visual disturbance: No  chest tightness: No  polyuria: No  difficulty urinating: No  menstrual problem: No  joint swelling: No  arthralgias: No  confusion: No  dysphoric mood: No

## 2025-02-18 ENCOUNTER — HOSPITAL ENCOUNTER (EMERGENCY)
Facility: HOSPITAL | Age: 35
Discharge: HOME | End: 2025-02-19
Attending: EMERGENCY MEDICINE
Payer: OTHER GOVERNMENT

## 2025-02-18 VITALS
OXYGEN SATURATION: 100 % | WEIGHT: 175 LBS | BODY MASS INDEX: 31.01 KG/M2 | RESPIRATION RATE: 19 BRPM | SYSTOLIC BLOOD PRESSURE: 130 MMHG | HEART RATE: 102 BPM | HEIGHT: 63 IN | TEMPERATURE: 98 F | DIASTOLIC BLOOD PRESSURE: 87 MMHG

## 2025-02-18 DIAGNOSIS — N20.0 KIDNEY STONE: Primary | ICD-10-CM

## 2025-02-18 PROCEDURE — 81025 URINE PREGNANCY TEST: CPT | Performed by: EMERGENCY MEDICINE

## 2025-02-18 PROCEDURE — 99285 EMERGENCY DEPT VISIT HI MDM: CPT | Mod: 25

## 2025-02-19 ENCOUNTER — RESULTS FOLLOW-UP (OUTPATIENT)
Dept: EMERGENCY MEDICINE | Facility: HOSPITAL | Age: 35
End: 2025-02-19
Payer: OTHER GOVERNMENT

## 2025-02-19 LAB
ALBUMIN SERPL BCP-MCNC: 4 G/DL (ref 3.5–5)
ALBUMIN/GLOB SERPL: 1.4 {RATIO}
ALP SERPL-CCNC: 54 U/L (ref 40–150)
ALT SERPL W P-5'-P-CCNC: 19 U/L
ANION GAP SERPL CALCULATED.3IONS-SCNC: 13 MMOL/L (ref 7–16)
AST SERPL W P-5'-P-CCNC: 16 U/L (ref 5–34)
B-HCG UR QL: NEGATIVE
BACTERIA #/AREA URNS HPF: ABNORMAL /HPF
BASOPHILS # BLD AUTO: 0.06 K/UL (ref 0–0.2)
BASOPHILS NFR BLD AUTO: 0.4 % (ref 0–1)
BILIRUB SERPL-MCNC: 0.9 MG/DL
BILIRUB UR QL STRIP: NEGATIVE
BUN SERPL-MCNC: 12 MG/DL (ref 7–19)
BUN/CREAT SERPL: 8 (ref 6–20)
CALCIUM SERPL-MCNC: 9.6 MG/DL (ref 8.4–10.2)
CHLORIDE SERPL-SCNC: 110 MMOL/L (ref 98–107)
CLARITY UR: CLEAR
CO2 SERPL-SCNC: 18 MMOL/L (ref 22–29)
COLOR UR: ABNORMAL
CREAT SERPL-MCNC: 1.49 MG/DL (ref 0.55–1.02)
CTP QC/QA: YES
DIFFERENTIAL METHOD BLD: ABNORMAL
EGFR (NO RACE VARIABLE) (RUSH/TITUS): 47 ML/MIN/1.73M2
EOSINOPHIL # BLD AUTO: 0.12 K/UL (ref 0–0.5)
EOSINOPHIL NFR BLD AUTO: 0.9 % (ref 1–4)
ERYTHROCYTE [DISTWIDTH] IN BLOOD BY AUTOMATED COUNT: 13.1 % (ref 11.5–14.5)
GLOBULIN SER-MCNC: 2.8 G/DL (ref 2–4)
GLUCOSE SERPL-MCNC: 112 MG/DL (ref 74–100)
GLUCOSE UR STRIP-MCNC: NORMAL MG/DL
HCT VFR BLD AUTO: 42.6 % (ref 38–47)
HGB BLD-MCNC: 14.3 G/DL (ref 12–16)
IMM GRANULOCYTES # BLD AUTO: 0.05 K/UL (ref 0–0.04)
IMM GRANULOCYTES NFR BLD: 0.4 % (ref 0–0.4)
KETONES UR STRIP-SCNC: NEGATIVE MG/DL
LEUKOCYTE ESTERASE UR QL STRIP: NEGATIVE
LYMPHOCYTES # BLD AUTO: 1.27 K/UL (ref 1–4.8)
LYMPHOCYTES NFR BLD AUTO: 9.3 % (ref 27–41)
MCH RBC QN AUTO: 28.3 PG (ref 27–31)
MCHC RBC AUTO-ENTMCNC: 33.6 G/DL (ref 32–36)
MCV RBC AUTO: 84.4 FL (ref 80–96)
MONOCYTES # BLD AUTO: 0.75 K/UL (ref 0–0.8)
MONOCYTES NFR BLD AUTO: 5.5 % (ref 2–6)
MPC BLD CALC-MCNC: 10.9 FL (ref 9.4–12.4)
NEUTROPHILS # BLD AUTO: 11.36 K/UL (ref 1.8–7.7)
NEUTROPHILS NFR BLD AUTO: 83.5 % (ref 53–65)
NITRITE UR QL STRIP: NEGATIVE
NRBC # BLD AUTO: 0 X10E3/UL
NRBC, AUTO (.00): 0 %
PH UR STRIP: 6.5 PH UNITS
PLATELET # BLD AUTO: 252 K/UL (ref 150–400)
POTASSIUM SERPL-SCNC: 3.4 MMOL/L (ref 3.5–5.1)
PROT SERPL-MCNC: 6.8 G/DL (ref 6.4–8.3)
PROT UR QL STRIP: NEGATIVE
RBC # BLD AUTO: 5.05 M/UL (ref 4.2–5.4)
RBC # UR STRIP: ABNORMAL /UL
RBC #/AREA URNS HPF: 31 /HPF
SODIUM SERPL-SCNC: 138 MMOL/L (ref 136–145)
SP GR UR STRIP: 1.02
SQUAMOUS #/AREA URNS LPF: ABNORMAL /HPF
UROBILINOGEN UR STRIP-ACNC: NORMAL MG/DL
WBC # BLD AUTO: 13.61 K/UL (ref 4.5–11)
WBC #/AREA URNS HPF: 2 /HPF

## 2025-02-19 PROCEDURE — 96374 THER/PROPH/DIAG INJ IV PUSH: CPT

## 2025-02-19 PROCEDURE — 63600175 PHARM REV CODE 636 W HCPCS: Mod: JZ,TB | Performed by: EMERGENCY MEDICINE

## 2025-02-19 PROCEDURE — 36415 COLL VENOUS BLD VENIPUNCTURE: CPT | Performed by: EMERGENCY MEDICINE

## 2025-02-19 PROCEDURE — 80053 COMPREHEN METABOLIC PANEL: CPT | Performed by: EMERGENCY MEDICINE

## 2025-02-19 PROCEDURE — 96376 TX/PRO/DX INJ SAME DRUG ADON: CPT

## 2025-02-19 PROCEDURE — 85025 COMPLETE CBC W/AUTO DIFF WBC: CPT | Performed by: EMERGENCY MEDICINE

## 2025-02-19 PROCEDURE — 81003 URINALYSIS AUTO W/O SCOPE: CPT | Performed by: EMERGENCY MEDICINE

## 2025-02-19 RX ORDER — KETOROLAC TROMETHAMINE 15 MG/ML
15 INJECTION, SOLUTION INTRAMUSCULAR; INTRAVENOUS
Status: COMPLETED | OUTPATIENT
Start: 2025-02-19 | End: 2025-02-19

## 2025-02-19 RX ORDER — PROMETHAZINE HYDROCHLORIDE 25 MG/1
25 TABLET ORAL EVERY 6 HOURS PRN
Qty: 15 TABLET | Refills: 0 | Status: SHIPPED | OUTPATIENT
Start: 2025-02-19

## 2025-02-19 RX ORDER — DICYCLOMINE HYDROCHLORIDE 20 MG/1
20 TABLET ORAL 2 TIMES DAILY
Qty: 20 TABLET | Refills: 0 | Status: SHIPPED | OUTPATIENT
Start: 2025-02-19 | End: 2025-03-21

## 2025-02-19 RX ORDER — TAMSULOSIN HYDROCHLORIDE 0.4 MG/1
0.4 CAPSULE ORAL DAILY
Qty: 10 CAPSULE | Refills: 0 | Status: SHIPPED | OUTPATIENT
Start: 2025-02-19 | End: 2026-02-19

## 2025-02-19 RX ORDER — IBUPROFEN 800 MG/1
800 TABLET ORAL EVERY 6 HOURS PRN
Qty: 20 TABLET | Refills: 0 | Status: SHIPPED | OUTPATIENT
Start: 2025-02-19

## 2025-02-19 RX ORDER — KETOROLAC TROMETHAMINE 30 MG/ML
15 INJECTION, SOLUTION INTRAMUSCULAR; INTRAVENOUS
Status: COMPLETED | OUTPATIENT
Start: 2025-02-19 | End: 2025-02-19

## 2025-02-19 RX ADMIN — KETOROLAC TROMETHAMINE 15 MG: 15 INJECTION, SOLUTION INTRAMUSCULAR; INTRAVENOUS at 01:02

## 2025-02-19 RX ADMIN — KETOROLAC TROMETHAMINE 15 MG: 30 INJECTION, SOLUTION INTRAMUSCULAR at 03:02

## 2025-02-19 NOTE — ED PROVIDER NOTES
Encounter Date: 2/18/2025    SCRIBE #1 NOTE: I, Lise Matthias, am scribing for, and in the presence of,  Krishan Patricio MD.       History     Chief Complaint   Patient presents with    Back Pain     Pt c/o left sided back pain that radiates to left side of abd. Pt states the pain came on suddenly tonight and having n/v and dizziness     This 34 y.o. male pt presents to the ED with c/o Right back/flank pain. The pt reports having back pain that moved to right flank pain that started this afternoon. Pt states she had a sudden onset of being nauseated and vomiting tonight. Pt state she has had kidney stones in the past but this time it feels worst.     The history is provided by the patient. No  was used.     Review of patient's allergies indicates:   Allergen Reactions    Bupropion Other (See Comments)     Made depression worse    Doxycycline Other (See Comments)     Palms/soles itchy, stomach cramps/diarrhea    Latex Other (See Comments)    Penicillins Rash     History reviewed. No pertinent past medical history.  History reviewed. No pertinent surgical history.  Family History   Problem Relation Name Age of Onset    Diabetes Father      Mitral valve prolapse Maternal Uncle       Social History[1]  Review of Systems   Constitutional:  Negative for chills, fatigue and fever.   Eyes:  Negative for pain.   Respiratory:  Negative for shortness of breath.    Cardiovascular:  Negative for chest pain.   Gastrointestinal:  Negative for nausea.   Endocrine: Negative for polyuria.   Genitourinary:  Positive for flank pain. Negative for difficulty urinating.   Allergic/Immunologic: Negative.    Psychiatric/Behavioral:  Negative for agitation and behavioral problems.    All other systems reviewed and are negative.      Physical Exam     Initial Vitals [02/18/25 2235]   BP Pulse Resp Temp SpO2   130/87 102 19 97.6 °F (36.4 °C) 100 %      MAP       --         Physical Exam    Nursing note and vitals  reviewed.  Constitutional: She appears well-developed and well-nourished.   HENT:   Head: Normocephalic and atraumatic.   Eyes: EOM are normal. Pupils are equal, round, and reactive to light.   Neck: Neck supple. No thyromegaly present.   Normal range of motion.  Cardiovascular:  Normal rate, regular rhythm, normal heart sounds and intact distal pulses.           No murmur heard.  Pulmonary/Chest: Breath sounds normal. She has no wheezes.   Abdominal: Abdomen is soft. Bowel sounds are normal. There is abdominal tenderness.   Pt has tenderness in right side.   Musculoskeletal:         General: No tenderness or edema. Normal range of motion.      Cervical back: Normal range of motion and neck supple.     Lymphadenopathy:     She has no cervical adenopathy.   Neurological: She is alert and oriented to person, place, and time. She has normal strength and normal reflexes. No cranial nerve deficit or sensory deficit. GCS score is 15. GCS eye subscore is 4. GCS verbal subscore is 5. GCS motor subscore is 6.   Skin: Skin is warm and dry. Capillary refill takes less than 2 seconds. No rash noted.   Psychiatric: She has a normal mood and affect.         ED Course   Procedures  Labs Reviewed   URINALYSIS, REFLEX TO URINE CULTURE - Abnormal       Result Value    Color, UA Light Yellow      Clarity, UA Clear      pH, UA 6.5      Leukocytes, UA Negative      Nitrites, UA Negative      Protein, UA Negative      Glucose, UA Normal      Ketones, UA Negative      Urobilinogen, UA Normal      Bilirubin, UA Negative      Blood, UA Small (*)     Specific [place], UA 1.022     URINALYSIS, MICROSCOPIC - Abnormal    WBC, UA 2      RBC, UA 31 (*)     Bacteria, UA Occasional (*)     Squamous Epithelial Cells, UA Occasional (*)    COMPREHENSIVE METABOLIC PANEL - Abnormal    Sodium 138      Potassium 3.4 (*)     Chloride 110 (*)     CO2 18 (*)     Anion Gap 13      Glucose 112 (*)     BUN 12      Creatinine 1.49 (*)     BUN/Creatinine Ratio 8       Calcium 9.6      Total Protein 6.8      Albumin 4.0      Globulin 2.8      A/G Ratio 1.4      Bilirubin, Total 0.9      Alk Phos 54      ALT 19      AST 16      eGFR 47 (*)    CBC WITH DIFFERENTIAL - Abnormal    WBC 13.61 (*)     RBC 5.05      Hemoglobin 14.3      Hematocrit 42.6      MCV 84.4      MCH 28.3      MCHC 33.6      RDW 13.1      Platelet Count 252      MPV 10.9      Neutrophils % 83.5 (*)     Lymphocytes % 9.3 (*)     Monocytes % 5.5      Eosinophils % 0.9 (*)     Basophils % 0.4      Immature Granulocytes % 0.4      nRBC, Auto 0.0      Neutrophils, Abs 11.36 (*)     Lymphocytes, Absolute 1.27      Monocytes, Absolute 0.75      Eosinophils, Absolute 0.12      Basophils, Absolute 0.06      Immature Granulocytes, Absolute 0.05 (*)     nRBC, Absolute 0.00      Diff Type Auto     CBC W/ AUTO DIFFERENTIAL    Narrative:     The following orders were created for panel order CBC auto differential.  Procedure                               Abnormality         Status                     ---------                               -----------         ------                     CBC with Differential[[id number]]       Abnormal            Final result                 Please view results for these tests on the individual orders.   POCT URINE PREGNANCY    POC Preg Test, Ur Negative       Acceptable Yes            Imaging Results              CT Renal Stone Study ABD Pelvis WO (Final result)  Result time 02/19/25 03:55:50   Notes recorded by JEFF Perez on 2/19/2025 at 8:07 AM CST  Please have her fu with her GYN regarding pelvic ultrasound for positioning of the IUD     Final result by Salvador Romero MD (02/19/25 03:55:50)                   Impression:      2 mm distal obstructive LEFT ureterovesical junction calculus with mild hydroureteronephrosis.  Multiple punctate nonobstructive bilateral renal calculi.    Suspect abnormal positioning of IUD, appearing rotated with myometrial extension.   Pelvic ultrasound recommended for further evaluation.    The preliminary [facility] and final reports are concordant.      Electronically signed by: Salvador Romero MD  Date:    02/19/2025  Time:    03:55               Narrative:    EXAMINATION:  CT RENAL STONE STUDY ABD PELVIS WO    CLINICAL HISTORY:  Flank pain, kidney stone suspected;    TECHNIQUE:  Low dose axial images, sagittal and coronal reformations were obtained from the lung bases to the pubic symphysis.  Contrast was not administered.    COMPARISON:  None    FINDINGS:  Heart: Normal in size. No pericardial effusion.    Lung Bases: Well aerated, without consolidation or pleural fluid.    Liver: Normal in size and attenuation, with no focal hepatic lesions.    Gallbladder: No calcified gallstones.    Bile Ducts: No evidence of dilated ducts.    Pancreas: No mass or peripancreatic fat stranding.    Spleen: Unremarkable.    Adrenals: Unremarkable.    Kidneys/ Ureters: Obstructive 2 mm calculus within the distal LEFT ureter with mild LEFT hydroureteronephrosis and perinephric/bill-ureteral stranding.  Multiple punctate nonobstructive calculi up to 3 mm within the kidneys..    Bladder: No evidence of wall thickening.    Reproductive organs: Questionable positioning IUD, appearing rotated with possible myometrial extension.  Pelvic ultrasound could further evaluate.  RIGHT adnexal cyst up to 3 cm, physiologic..    GI Tract/Mesentery: No evidence of bowel obstruction or inflammation.    Peritoneal Space: No ascites. No free air.    Retroperitoneum: No significant adenopathy.    Abdominal wall: Unremarkable.    Vasculature: No significant atherosclerosis or aneurysm.    Bones: No acute fracture.                                       Medications   ketorolac injection 15 mg (15 mg Intravenous Given 2/19/25 0130)   ketorolac injection 15 mg (15 mg Intravenous Given 2/19/25 0310)     Medical Decision Making  Pt c/o left sided back pain that radiates to left side of  abd. Pt states the pain came on suddenly tonight and having n/v and dizziness.  CT scan of the abdomen and pelvis without contrast showed 2 mm calculus within the left distal ureter with mild hydronephrosis and perinephric stranding and periureteral stranding.  The patient is informed.  Incidental finding of abnormality with the IUD the patient is informed to follow up with her OBGYN about it    Amount and/or Complexity of Data Reviewed  Labs: ordered.  Radiology: ordered.    Risk  Prescription drug management.              Attending Attestation:           Physician Attestation for Scribe:  Physician Attestation Statement for Scribe #1: I, Krishan Patricio MD, reviewed documentation, as scribed by Lise Rizzo in my presence, and it is both accurate and complete.                                    Clinical Impression:  Final diagnoses:  [N20.0] Kidney stone (Primary)          ED Disposition Condition    Discharge Stable          ED Prescriptions       Medication Sig Dispense Start Date End Date Auth. Provider    ibuprofen (ADVIL,MOTRIN) 800 MG tablet Take 1 tablet (800 mg total) by mouth every 6 (six) hours as needed for Pain. 20 tablet 2/19/2025 -- Krishan Patricio MD    tamsulosin (FLOMAX) 0.4 mg Cap Take 1 capsule (0.4 mg total) by mouth once daily. 10 capsule 2/19/2025 2/19/2026 Krishan Patricio MD    promethazine (PHENERGAN) 25 MG tablet Take 1 tablet (25 mg total) by mouth every 6 (six) hours as needed for Nausea. 15 tablet 2/19/2025 -- Krishan Patricio MD    dicyclomine (BENTYL) 20 mg tablet Take 1 tablet (20 mg total) by mouth 2 (two) times daily. 20 tablet 2/19/2025 3/21/2025 Krishan Patricio MD          Follow-up Information    None              [1]   Social History  Tobacco Use    Smoking status: Never     Passive exposure: Past    Smokeless tobacco: Never   Substance Use Topics    Alcohol use: Yes     Comment: occ    Drug use: Never        Krishan Patricio MD  02/19/25 2290

## 2025-02-19 NOTE — TELEPHONE ENCOUNTER
----- Message from JEFF Perez sent at 2/19/2025  8:07 AM CST -----  Please have her fu with her GYN regarding pelvic ultrasound for positioning of the IUD  ----- Message -----  From: Interface, Rad Results In  Sent: 2/19/2025   3:58 AM CST  To: [facility] Results Follow Up Pool

## 2025-02-21 ENCOUNTER — OFFICE VISIT (OUTPATIENT)
Dept: FAMILY MEDICINE | Facility: CLINIC | Age: 35
End: 2025-02-21
Payer: OTHER GOVERNMENT

## 2025-02-21 VITALS
TEMPERATURE: 98 F | SYSTOLIC BLOOD PRESSURE: 136 MMHG | RESPIRATION RATE: 15 BRPM | WEIGHT: 171.38 LBS | HEART RATE: 107 BPM | BODY MASS INDEX: 30.37 KG/M2 | OXYGEN SATURATION: 99 % | HEIGHT: 63 IN | DIASTOLIC BLOOD PRESSURE: 84 MMHG

## 2025-02-21 DIAGNOSIS — F98.8 ADULT ATTENTION DEFICIT DISORDER: Primary | ICD-10-CM

## 2025-02-21 DIAGNOSIS — Z79.899 LONG-TERM USE OF HIGH-RISK MEDICATION: ICD-10-CM

## 2025-02-21 LAB
CTP QC/QA: YES
POC (AMP) AMPHETAMINE: ABNORMAL
POC (BAR) BARBITURATES: NEGATIVE
POC (BUP) BUPRENORPHINE: NEGATIVE
POC (BZO) BENZODIAZEPINES: NEGATIVE
POC (COC) COCAINE: NEGATIVE
POC (MDMA) METHYLENEDIOXYMETHAMPHETAMINE 3,4: NEGATIVE
POC (MET) METHAMPHETAMINE: NEGATIVE
POC (MOP) OPIATES: NEGATIVE
POC (MTD) METHADONE: NEGATIVE
POC (OXY) OXYCODONE: NEGATIVE
POC (PCP) PHENCYCLIDINE: NEGATIVE
POC (TCA) TRICYCLIC ANTIDEPRESSANTS: NEGATIVE
POC TEMPERATURE (URINE): 93
POC THC: NEGATIVE

## 2025-02-21 RX ORDER — DEXTROAMPHETAMINE SACCHARATE, AMPHETAMINE ASPARTATE, DEXTROAMPHETAMINE SULFATE AND AMPHETAMINE SULFATE 7.5; 7.5; 7.5; 7.5 MG/1; MG/1; MG/1; MG/1
1 TABLET ORAL DAILY
Qty: 30 TABLET | Refills: 0 | Status: SHIPPED | OUTPATIENT
Start: 2025-02-21 | End: 2025-03-23

## 2025-02-21 RX ORDER — DEXTROAMPHETAMINE SACCHARATE, AMPHETAMINE ASPARTATE, DEXTROAMPHETAMINE SULFATE AND AMPHETAMINE SULFATE 7.5; 7.5; 7.5; 7.5 MG/1; MG/1; MG/1; MG/1
1 TABLET ORAL DAILY
Qty: 30 TABLET | Refills: 0 | Status: SHIPPED | OUTPATIENT
Start: 2025-03-21 | End: 2025-04-20

## 2025-02-21 RX ORDER — DEXTROAMPHETAMINE SACCHARATE, AMPHETAMINE ASPARTATE, DEXTROAMPHETAMINE SULFATE AND AMPHETAMINE SULFATE 7.5; 7.5; 7.5; 7.5 MG/1; MG/1; MG/1; MG/1
1 TABLET ORAL DAILY
Qty: 30 TABLET | Refills: 0 | Status: CANCELLED | OUTPATIENT
Start: 2025-02-21 | End: 2025-03-23

## 2025-02-21 RX ORDER — DEXTROAMPHETAMINE SACCHARATE, AMPHETAMINE ASPARTATE, DEXTROAMPHETAMINE SULFATE AND AMPHETAMINE SULFATE 7.5; 7.5; 7.5; 7.5 MG/1; MG/1; MG/1; MG/1
1 TABLET ORAL DAILY
Qty: 30 TABLET | Refills: 0 | Status: SHIPPED | OUTPATIENT
Start: 2025-04-21 | End: 2025-05-21

## 2025-02-21 NOTE — PROGRESS NOTES
Lauren Mitchell is a 34 y.o. female seen today for follow-up on her attention deficit disorder.  She reports the Adderall is working well with no side effects.  She denies chest pain shortness a breath palpitations and abnormal weight loss.  Unfortunately she was seen in the ER for multiple renal stones in his urology consult has already been placed.  We discussed hydration and avoidance of tea.  The patient has had no signs or symptoms of tolerance or addiction and her  today was appropriate.  Today's drug screen only showed her prescribed amphetamine.    History reviewed. No pertinent past medical history.  Family History   Problem Relation Name Age of Onset    Diabetes Father      Mitral valve prolapse Maternal Uncle       Medications Ordered Prior to Encounter[1]    There is no immunization history on file for this patient.    Review of Systems   Constitutional:  Negative for fever, malaise/fatigue and weight loss.   Respiratory:  Negative for shortness of breath.    Cardiovascular:  Negative for chest pain and palpitations.   Gastrointestinal:  Negative for nausea and vomiting.   Psychiatric/Behavioral:  Negative for depression.         Vitals:    02/21/25 1044   BP: 136/84   Pulse: 107   Resp: 15   Temp: 97.6 °F (36.4 °C)       Physical Exam  Vitals reviewed.   Constitutional:       Appearance: She is normal weight.   Eyes:      Conjunctiva/sclera: Conjunctivae normal.   Pulmonary:      Effort: Pulmonary effort is normal.   Neurological:      Mental Status: She is alert.   Psychiatric:         Mood and Affect: Mood normal.         Behavior: Behavior normal.         Thought Content: Thought content normal.         Judgment: Judgment normal.          Assessment and Plan  1. Adult attention deficit disorder  -     dextroamphetamine-amphetamine 30 mg Tab; Take 1 tablet (30 mg total) by mouth once daily.  Dispense: 30 tablet; Refill: 0  -     dextroamphetamine-amphetamine 30 mg Tab; Take 1 tablet (30 mg total) by  mouth once daily.  Dispense: 30 tablet; Refill: 0  -     dextroamphetamine-amphetamine 30 mg Tab; Take 1 tablet (30 mg total) by mouth once daily.  Dispense: 30 tablet; Refill: 0    2. Long-term use of high-risk medication  -     POCT Urine Drug Screen Presump             Return to clinic in 3 months or as needed.    Health Maintenance Topics with due status: Not Due       Topic Last Completion Date    TETANUS VACCINE 06/19/2024    RSV Vaccine (Age 60+ and Pregnant patients) Not Due              [1]   Current Outpatient Medications on File Prior to Visit   Medication Sig Dispense Refill    cetirizine-pseudoephedrine 5-120 mg Tb12 Take 1 tablet by mouth once daily.      dicyclomine (BENTYL) 20 mg tablet Take 1 tablet (20 mg total) by mouth 2 (two) times daily. 20 tablet 0    ibuprofen (ADVIL,MOTRIN) 800 MG tablet Take 1 tablet (800 mg total) by mouth every 6 (six) hours as needed for Pain. 20 tablet 0    promethazine (PHENERGAN) 25 MG tablet Take 1 tablet (25 mg total) by mouth every 6 (six) hours as needed for Nausea. 15 tablet 0    tamsulosin (FLOMAX) 0.4 mg Cap Take 1 capsule (0.4 mg total) by mouth once daily. 10 capsule 0    topiramate (TOPAMAX) 50 MG tablet Take 1 tablet (50 mg total) by mouth 2 (two) times daily. 180 tablet 1    [DISCONTINUED] dextroamphetamine-amphetamine 30 mg Tab Take 1 tablet (30 mg total) by mouth once daily. 30 tablet 0    cefUROXime (CEFTIN) 500 MG tablet Take 1 tablet (500 mg total) by mouth every 12 (twelve) hours. (Patient not taking: Reported on 2/21/2025) 20 tablet 0    sulfamethoxazole-trimethoprim 800-160mg (BACTRIM DS) 800-160 mg Tab Take 1 tablet by mouth 2 (two) times daily. (Patient not taking: Reported on 2/21/2025) 20 tablet 0     No current facility-administered medications on file prior to visit.

## 2025-03-11 ENCOUNTER — OFFICE VISIT (OUTPATIENT)
Dept: CARDIOLOGY | Facility: CLINIC | Age: 35
End: 2025-03-11
Payer: OTHER GOVERNMENT

## 2025-03-11 ENCOUNTER — RESULTS FOLLOW-UP (OUTPATIENT)
Dept: CARDIOLOGY | Facility: CLINIC | Age: 35
End: 2025-03-11

## 2025-03-11 ENCOUNTER — HOSPITAL ENCOUNTER (OUTPATIENT)
Dept: RADIOLOGY | Facility: HOSPITAL | Age: 35
Discharge: HOME | End: 2025-03-11
Attending: NURSE PRACTITIONER
Payer: OTHER GOVERNMENT

## 2025-03-11 ENCOUNTER — HOSPITAL ENCOUNTER (OUTPATIENT)
Dept: CARDIOLOGY | Facility: HOSPITAL | Age: 35
Discharge: HOME | End: 2025-03-11
Attending: NURSE PRACTITIONER
Payer: OTHER GOVERNMENT

## 2025-03-11 VITALS
OXYGEN SATURATION: 100 % | SYSTOLIC BLOOD PRESSURE: 114 MMHG | DIASTOLIC BLOOD PRESSURE: 72 MMHG | WEIGHT: 172 LBS | BODY MASS INDEX: 30.48 KG/M2 | HEIGHT: 63 IN | HEART RATE: 106 BPM

## 2025-03-11 DIAGNOSIS — R06.02 SOB (SHORTNESS OF BREATH) ON EXERTION: ICD-10-CM

## 2025-03-11 DIAGNOSIS — M79.89 LEG SWELLING: ICD-10-CM

## 2025-03-11 DIAGNOSIS — R00.2 PALPITATIONS: Primary | ICD-10-CM

## 2025-03-11 DIAGNOSIS — R00.2 PALPITATIONS: ICD-10-CM

## 2025-03-11 PROCEDURE — 99999 PR PBB SHADOW E&M-EST. PATIENT-LVL IV: CPT | Mod: PBBFAC,,, | Performed by: NURSE PRACTITIONER

## 2025-03-11 PROCEDURE — 99214 OFFICE O/P EST MOD 30 MIN: CPT | Mod: S$PBB,,, | Performed by: NURSE PRACTITIONER

## 2025-03-11 PROCEDURE — 99214 OFFICE O/P EST MOD 30 MIN: CPT | Mod: PBBFAC | Performed by: NURSE PRACTITIONER

## 2025-03-11 PROCEDURE — 93246 EXT ECG>7D<15D RECORDING: CPT

## 2025-03-11 PROCEDURE — 71046 X-RAY EXAM CHEST 2 VIEWS: CPT | Mod: 26,,, | Performed by: RADIOLOGY

## 2025-03-11 PROCEDURE — 71046 X-RAY EXAM CHEST 2 VIEWS: CPT | Mod: TC

## 2025-03-11 NOTE — ASSESSMENT & PLAN NOTE
HR 90s-100  Palpitations with exertion  Labs today including TSH  ZIO cardiac monitor - wear for 14 days

## 2025-03-11 NOTE — ASSESSMENT & PLAN NOTE
Also with c/o legs and hand swelling in the mornings  Denies orthopnea or PND  Labs and chest xray today  Echo in 2022 was normal  Pt gave birth in 2024  Echo as outpatient

## 2025-03-11 NOTE — ASSESSMENT & PLAN NOTE
No edema on exam  Has c/o legs and hands swelling in the mornings   Denies orthopnea or PND  Echo in 2022 was normal  Gave birth in 2024  Repeat echo as outpatient

## 2025-03-11 NOTE — PROGRESS NOTES
"PCP: Sanjay Walton MD    Referring Provider:     Subjective:   Lauren Mitchell is a 35 y.o. female with hx of palpitations and ADHD who presents for one year follow up.     Pt has c/o SOB on exertion, palpitations on exertion and swelling in her hands and feet in the mornings. She states she is "out of breath after climbing a flight of stairs." She denies any chest pain, neck or jaw pain, diaphoresis, orthopnea or PND. Echo in 2022 was normal. EKG today shows sinus rhythm with HR 98bpm. She states she has noticed her HR now stays around 100bpm. Pt takes Adderall for ADHD and has been on this medication for the last 10-12 years.         Fhx: no premature CAD - Father has DM2  Shx: Never smoker, occasional ETOH use, no illicit drug use    EKG   Results for orders placed or performed in visit on 03/12/24   EKG 12-lead    Collection Time: 03/12/24  3:05 PM   Result Value Ref Range    QRS Duration 82 ms    [facility] QTC Calculation 409 ms    Narrative    Test Reason : R94.31,    Vent. Rate : 077 BPM     Atrial Rate : 077 BPM     P-R Int : 148 ms          QRS Dur : 082 ms      QT Int : 362 ms       P-R-T Axes : 059 095 040 degrees     QTc Int : 409 ms    Normal sinus rhythm  Rightward axis  Borderline Abnormal ECG  When compared with ECG of 01-MAR-2023 14:24,  Criteria for Septal infarct are no longer Present  Confirmed by Milton Balbuena DO ([id number]) on 3/26/2024 12:56:57 PM    Referred By:             Confirmed By:Milton Balbuena DO     ECHO Results for orders placed during the hospital encounter of 01/11/22    Echo    Interpretation Summary  · The left ventricle is normal in size with normal systolic function.  · The estimated ejection fraction is 55%.  · Normal left ventricular diastolic function.  · Atrial fibrillation not observed.  · Normal right ventricular size.  · Mild tricuspid regurgitation.  · Normal central venous pressure (3 mmHg).  · The estimated PA systolic pressure is 32 mmHg.    [facility] No results found for " "this or any previous visit.        Lab Results   Component Value Date     02/19/2025    K 3.4 (L) 02/19/2025     (H) 02/19/2025    CO2 18 (L) 02/19/2025    BUN 12 02/19/2025    CREATININE 1.49 (H) 02/19/2025    CALCIUM 9.6 02/19/2025    ANIONGAP 13 02/19/2025       Lab Results   Component Value Date    CHOL 137 10/04/2023     Lab Results   Component Value Date    HDL 48 10/04/2023     Lab Results   Component Value Date    LDLCALC 76 10/04/2023     Lab Results   Component Value Date    TRIG 63 10/04/2023     Lab Results   Component Value Date    CHOLHDL 2.9 10/04/2023       Lab Results   Component Value Date    WBC 13.61 (H) 02/19/2025    HGB 14.3 02/19/2025    HCT 42.6 02/19/2025    MCV 84.4 02/19/2025     02/19/2025         Current Medications[1]    Review of Systems   Constitutional:  Negative for chills, diaphoresis, fever and malaise/fatigue.   Respiratory:  Positive for shortness of breath. Negative for cough.    Cardiovascular:  Positive for palpitations and leg swelling. Negative for chest pain, orthopnea and PND.   Gastrointestinal:  Negative for abdominal pain, nausea and vomiting.   Musculoskeletal:  Negative for falls.   Neurological:  Negative for focal weakness and weakness.         Objective:   /72 (BP Location: Left arm, Patient Position: Sitting)   Pulse 106   Ht 5' 3" (1.6 m)   Wt 78 kg (172 lb)   LMP  (LMP Unknown)   SpO2 100%   BMI 30.47 kg/m²     Physical Exam  Constitutional:       General: She is not in acute distress.     Appearance: Normal appearance.   Cardiovascular:      Rate and Rhythm: Normal rate and regular rhythm.      Pulses: Normal pulses.      Heart sounds: Normal heart sounds.   Pulmonary:      Effort: Pulmonary effort is normal.      Breath sounds: Normal breath sounds.   Musculoskeletal:      Cervical back: Neck supple. No rigidity.      Right lower leg: No edema.      Left lower leg: No edema.   Skin:     General: Skin is warm and dry. "   Neurological:      Mental Status: She is alert.           Assessment:     1. Palpitations  Magnesium    Basic Metabolic Panel    Echo    Cardiac Monitor - 3-15 Day Adult    TSH      2. SOB (shortness of breath) on exertion  Magnesium    Basic Metabolic Panel    Echo    Cardiac Monitor - 3-15 Day Adult    X-Ray Chest PA And Lateral      3. Leg swelling  Echo            Plan:   Palpitations  HR 90s-100  Palpitations with exertion  Labs today including TSH  ZIO cardiac monitor - wear for 14 days    SOB (shortness of breath) on exertion  Also with c/o legs and hand swelling in the mornings  Denies orthopnea or PND  Labs and chest xray today  Echo in 2022 was normal  Pt gave birth in 2024  Echo as outpatient    Leg swelling  No edema on exam  Has c/o legs and hands swelling in the mornings   Denies orthopnea or PND  Echo in 2022 was normal  Gave birth in 2024  Repeat echo as outpatient               [1]   Current Outpatient Medications:     cetirizine-pseudoephedrine 5-120 mg Tb12, Take 1 tablet by mouth once daily., Disp: , Rfl:     [START ON 4/21/2025] dextroamphetamine-amphetamine 30 mg Tab, Take 1 tablet (30 mg total) by mouth once daily., Disp: 30 tablet, Rfl: 0    [START ON 3/21/2025] dextroamphetamine-amphetamine 30 mg Tab, Take 1 tablet (30 mg total) by mouth once daily., Disp: 30 tablet, Rfl: 0    dextroamphetamine-amphetamine 30 mg Tab, Take 1 tablet (30 mg total) by mouth once daily., Disp: 30 tablet, Rfl: 0    dicyclomine (BENTYL) 20 mg tablet, Take 1 tablet (20 mg total) by mouth 2 (two) times daily., Disp: 20 tablet, Rfl: 0    ibuprofen (ADVIL,MOTRIN) 800 MG tablet, Take 1 tablet (800 mg total) by mouth every 6 (six) hours as needed for Pain., Disp: 20 tablet, Rfl: 0    promethazine (PHENERGAN) 25 MG tablet, Take 1 tablet (25 mg total) by mouth every 6 (six) hours as needed for Nausea., Disp: 15 tablet, Rfl: 0    tamsulosin (FLOMAX) 0.4 mg Cap, Take 1 capsule (0.4 mg total) by mouth once daily., Disp: 10  capsule, Rfl: 0    topiramate (TOPAMAX) 50 MG tablet, Take 1 tablet (50 mg total) by mouth 2 (two) times daily., Disp: 180 tablet, Rfl: 1

## 2025-03-11 NOTE — PATIENT INSTRUCTIONS
Labs today  ZIO heart monitor - wear for 14 days and send back as instructed  Echo as outpatient  We will call you with results  Follow up with me in 3 months

## 2025-03-13 ENCOUNTER — TELEPHONE (OUTPATIENT)
Dept: CARDIOLOGY | Facility: CLINIC | Age: 35
End: 2025-03-13
Payer: OTHER GOVERNMENT

## 2025-03-13 NOTE — TELEPHONE ENCOUNTER
Called patient with normal chest xray and normal labs, no answer left message on  with call back number for any questions    ----- Message from JEFF Rocha sent at 3/11/2025  1:15 PM CDT -----  Please call and let her know her labs and chest x-ray are normal.  ----- Message -----  From: Lab, Background User  Sent: 3/11/2025  12:07 PM CDT  To: JEFF Lerma

## 2025-03-25 ENCOUNTER — HOSPITAL ENCOUNTER (OUTPATIENT)
Dept: CARDIOLOGY | Facility: HOSPITAL | Age: 35
Discharge: HOME | End: 2025-03-25
Attending: NURSE PRACTITIONER
Payer: OTHER GOVERNMENT

## 2025-03-25 DIAGNOSIS — M79.89 LEG SWELLING: ICD-10-CM

## 2025-03-25 DIAGNOSIS — R00.2 PALPITATIONS: ICD-10-CM

## 2025-03-25 DIAGNOSIS — R06.02 SOB (SHORTNESS OF BREATH) ON EXERTION: ICD-10-CM

## 2025-03-25 PROCEDURE — 93306 TTE W/DOPPLER COMPLETE: CPT | Mod: 26,,, | Performed by: INTERNAL MEDICINE

## 2025-03-25 PROCEDURE — 93306 TTE W/DOPPLER COMPLETE: CPT

## 2025-03-26 DIAGNOSIS — G43.709 CHRONIC MIGRAINE WITHOUT AURA WITHOUT STATUS MIGRAINOSUS, NOT INTRACTABLE: ICD-10-CM

## 2025-03-26 RX ORDER — TOPIRAMATE 50 MG/1
50 TABLET, FILM COATED ORAL 2 TIMES DAILY
Qty: 180 TABLET | Refills: 1 | Status: SHIPPED | OUTPATIENT
Start: 2025-03-26

## 2025-03-28 ENCOUNTER — TELEPHONE (OUTPATIENT)
Dept: CARDIOLOGY | Facility: CLINIC | Age: 35
End: 2025-03-28
Payer: OTHER GOVERNMENT

## 2025-03-28 LAB
AORTIC ROOT ANNULUS: 2.76 CM
AORTIC VALVE CUSP SEPERATION: 2.03 CM
AV INDEX (PROSTH): 0.79
AV MEAN GRADIENT: 3 MMHG
AV PEAK GRADIENT: 6 MMHG
AV VALVE AREA BY VELOCITY RATIO: 1.9 CM²
AV VALVE AREA: 2 CM²
AV VELOCITY RATIO: 0.75
CV ECHO LV RWT: 0.29 CM
DOP CALC AO PEAK VEL: 1.2 M/S
DOP CALC AO VTI: 20.8 CM
DOP CALC LVOT AREA: 2.5 CM2
DOP CALC LVOT DIAMETER: 1.8 CM
DOP CALC LVOT PEAK VEL: 0.9 M/S
DOP CALC LVOT STROKE VOLUME: 41.7 CM3
DOP CALCLVOT PEAK VEL VTI: 16.4 CM
E WAVE DECELERATION TIME: 225 MSEC
E/A RATIO: 0.97
E/E' RATIO: 7 M/S
ECHO LV POSTERIOR WALL: 0.6 CM (ref 0.6–1.1)
EJECTION FRACTION: 60 %
FRACTIONAL SHORTENING: 24.4 % (ref 28–44)
INTERVENTRICULAR SEPTUM: 0.8 CM (ref 0.6–1.1)
IVC DIAMETER: 1.26 CM
LEFT ATRIUM AREA SYSTOLIC (APICAL 2 CHAMBER): 12.56 CM2
LEFT ATRIUM AREA SYSTOLIC (APICAL 4 CHAMBER): 15.53 CM2
LEFT ATRIUM VOLUME MOD: 33 ML
LEFT INTERNAL DIMENSION IN SYSTOLE: 3.1 CM (ref 2.1–4)
LEFT VENTRICLE DIASTOLIC VOLUME: 73 ML
LEFT VENTRICLE END SYSTOLIC VOLUME APICAL 2 CHAMBER: 26.93 ML
LEFT VENTRICLE END SYSTOLIC VOLUME APICAL 4 CHAMBER: 38.18 ML
LEFT VENTRICLE SYSTOLIC VOLUME: 39 ML
LEFT VENTRICULAR INTERNAL DIMENSION IN DIASTOLE: 4.1 CM (ref 3.5–6)
LEFT VENTRICULAR MASS: 81.7 G
LV LATERAL E/E' RATIO: 6.5 M/S
LV SEPTAL E/E' RATIO: 8.4 M/S
LVED V (TEICH): 72.71 ML
LVES V (TEICH): 39.09 ML
LVOT MG: 1.78 MMHG
LVOT MV: 0.62 CM/S
MV PEAK A VEL: 0.87 M/S
MV PEAK E VEL: 0.84 M/S
OHS CV RV/LV RATIO: 0.98 CM
PISA TR MAX VEL: 2.1 M/S
PV PEAK GRADIENT: 5 MMHG
PV PEAK VELOCITY: 1.08 M/S
RA PRESSURE ESTIMATED: 3 MMHG
RA VOL SYS: 35.09 ML
RIGHT ATRIAL AREA: 13.6 CM2
RIGHT ATRIUM VOLUME AREA LENGTH APICAL 4 CHAMBER: 32.68 ML
RIGHT VENTRICLE DIASTOLIC BASEL DIMENSION: 4 CM
RIGHT VENTRICLE DIASTOLIC LENGTH: 7 CM
RIGHT VENTRICLE DIASTOLIC MID DIMENSION: 2.1 CM
RIGHT VENTRICULAR LENGTH IN DIASTOLE (APICAL 4-CHAMBER VIEW): 6.97 CM
RV MID DIAMA: 2.06 CM
RV TB RVSP: 5 MMHG
TDI LATERAL: 0.13 M/S
TDI SEPTAL: 0.1 M/S
TDI: 0.12 M/S
TR MAX PG: 18 MMHG
TRICUSPID ANNULAR PLANE SYSTOLIC EXCURSION: 1.96 CM
TV REST PULMONARY ARTERY PRESSURE: 21 MMHG

## 2025-03-28 NOTE — TELEPHONE ENCOUNTER
Called patient with  normal results    ----- Message from JEFF Rocha sent at 3/28/2025  9:07 AM CDT -----  Please call and let her know that her echo is normal.   ----- Message -----  From: Bandar Valenzuela MD  Sent: 3/28/2025   8:13 AM CDT  To: JEFF Lerma

## 2025-04-07 ENCOUNTER — TELEPHONE (OUTPATIENT)
Dept: CARDIOLOGY | Facility: CLINIC | Age: 35
End: 2025-04-07
Payer: OTHER GOVERNMENT

## 2025-04-07 NOTE — TELEPHONE ENCOUNTER
Called patient with test results, no answer, left message on vm for her to return my call after four    ----- Message from JEFF Rocha sent at 4/4/2025 12:19 PM CDT -----  Please call and let her know her Zio cardiac monitor is normal.  ----- Message -----  From: Milton Balbuena DO  Sent: 4/4/2025  10:39 AM CDT  To: JEFF Lerma

## 2025-04-08 ENCOUNTER — TELEPHONE (OUTPATIENT)
Dept: CARDIOLOGY | Facility: CLINIC | Age: 35
End: 2025-04-08
Payer: OTHER GOVERNMENT

## 2025-04-08 NOTE — TELEPHONE ENCOUNTER
Called patient with normal ZIO results     ----- Message from JEFF Rocha sent at 4/4/2025 12:19 PM CDT -----  Please call and let her know her Zio cardiac monitor is normal.  ----- Message -----  From: Milton Balbuena DO  Sent: 4/4/2025  10:39 AM CDT  To: JEFF Lerma

## 2025-05-21 ENCOUNTER — OFFICE VISIT (OUTPATIENT)
Dept: FAMILY MEDICINE | Facility: CLINIC | Age: 35
End: 2025-05-21
Payer: OTHER GOVERNMENT

## 2025-05-21 VITALS
RESPIRATION RATE: 18 BRPM | DIASTOLIC BLOOD PRESSURE: 78 MMHG | HEIGHT: 63 IN | WEIGHT: 167 LBS | SYSTOLIC BLOOD PRESSURE: 118 MMHG | BODY MASS INDEX: 29.59 KG/M2 | TEMPERATURE: 98 F | OXYGEN SATURATION: 100 % | HEART RATE: 89 BPM

## 2025-05-21 DIAGNOSIS — F98.8 ADULT ATTENTION DEFICIT DISORDER: Primary | ICD-10-CM

## 2025-05-21 DIAGNOSIS — Z79.899 LONG-TERM USE OF HIGH-RISK MEDICATION: ICD-10-CM

## 2025-05-21 PROCEDURE — 80305 DRUG TEST PRSMV DIR OPT OBS: CPT | Mod: QW,,, | Performed by: FAMILY MEDICINE

## 2025-05-21 PROCEDURE — 99213 OFFICE O/P EST LOW 20 MIN: CPT | Mod: ,,, | Performed by: FAMILY MEDICINE

## 2025-05-21 RX ORDER — DEXTROAMPHETAMINE SACCHARATE, AMPHETAMINE ASPARTATE, DEXTROAMPHETAMINE SULFATE AND AMPHETAMINE SULFATE 7.5; 7.5; 7.5; 7.5 MG/1; MG/1; MG/1; MG/1
1 TABLET ORAL DAILY
Qty: 30 TABLET | Refills: 0 | Status: SHIPPED | OUTPATIENT
Start: 2025-06-20 | End: 2025-07-20

## 2025-05-21 RX ORDER — DEXTROAMPHETAMINE SACCHARATE, AMPHETAMINE ASPARTATE, DEXTROAMPHETAMINE SULFATE AND AMPHETAMINE SULFATE 7.5; 7.5; 7.5; 7.5 MG/1; MG/1; MG/1; MG/1
1 TABLET ORAL DAILY
Qty: 30 TABLET | Refills: 0 | Status: SHIPPED | OUTPATIENT
Start: 2025-07-20 | End: 2025-08-19

## 2025-05-21 RX ORDER — DEXTROAMPHETAMINE SACCHARATE, AMPHETAMINE ASPARTATE, DEXTROAMPHETAMINE SULFATE AND AMPHETAMINE SULFATE 7.5; 7.5; 7.5; 7.5 MG/1; MG/1; MG/1; MG/1
1 TABLET ORAL DAILY
Qty: 30 TABLET | Refills: 0 | Status: SHIPPED | OUTPATIENT
Start: 2025-05-21 | End: 2025-06-20

## 2025-05-21 NOTE — PROGRESS NOTES
Lauren Mitchell is a 35 y.o. female seen today for follow-up on her attention deficit disorder.  She reports the Adderall is working well with no side effects.  Currently she denies any palpitations chest pain shortness a breath.  Today she has no new complaints.  Today's  was appropriate and her urine drug screen today only showed her prescribed amphetamine.  She has had no signs or symptoms of tolerance or addiction and she is meeting her ADLs.    Past Medical History:   Diagnosis Date    ADHD (attention deficit hyperactivity disorder)      Family History   Problem Relation Name Age of Onset    Diabetes Father      Mitral valve prolapse Maternal Uncle       Medications Ordered Prior to Encounter[1]    There is no immunization history on file for this patient.    Review of Systems   Constitutional:  Negative for fever, malaise/fatigue and weight loss.   Respiratory:  Negative for shortness of breath.    Cardiovascular:  Negative for chest pain and palpitations.   Gastrointestinal:  Negative for nausea and vomiting.   Psychiatric/Behavioral:  Negative for depression.         Vitals:    05/21/25 0942   BP: 118/78   Pulse: 89   Resp: 18   Temp: 98.3 °F (36.8 °C)       Physical Exam  Vitals reviewed.   Constitutional:       Appearance: Normal appearance.   HENT:      Head: Normocephalic.   Eyes:      Extraocular Movements: Extraocular movements intact.      Conjunctiva/sclera: Conjunctivae normal.      Pupils: Pupils are equal, round, and reactive to light.   Neck:      Thyroid: No thyroid mass or thyromegaly.   Cardiovascular:      Rate and Rhythm: Normal rate and regular rhythm.      Heart sounds: Normal heart sounds. No murmur heard.     No gallop.   Pulmonary:      Effort: Pulmonary effort is normal. No respiratory distress.      Breath sounds: Normal breath sounds. No wheezing or rales.   Skin:     General: Skin is warm and dry.      Coloration: Skin is not jaundiced or pale.   Neurological:      Mental Status:  She is alert.   Psychiatric:         Mood and Affect: Mood normal.         Behavior: Behavior normal.         Thought Content: Thought content normal.         Judgment: Judgment normal.          Assessment and Plan  1. Adult attention deficit disorder  -     dextroamphetamine-amphetamine 30 mg Tab; Take 1 tablet (30 mg total) by mouth once daily.  Dispense: 30 tablet; Refill: 0  -     dextroamphetamine-amphetamine 30 mg Tab; Take 1 tablet (30 mg total) by mouth once daily.  Dispense: 30 tablet; Refill: 0  -     dextroamphetamine-amphetamine 30 mg Tab; Take 1 tablet (30 mg total) by mouth once daily.  Dispense: 30 tablet; Refill: 0    2. Long-term use of high-risk medication  -     POCT Urine Drug Screen Presump             Return to clinic in 3 months or as needed.  Her next visit will be her wellness.    Health Maintenance Topics with due status: Not Due       Topic Last Completion Date    Influenza Vaccine 12/28/2022    TETANUS VACCINE 06/19/2024    RSV Vaccine (Age 60+ and Pregnant patients) Not Due              [1]   Current Outpatient Medications on File Prior to Visit   Medication Sig Dispense Refill    cetirizine-pseudoephedrine 5-120 mg Tb12 Take 1 tablet by mouth once daily.      ibuprofen (ADVIL,MOTRIN) 800 MG tablet Take 1 tablet (800 mg total) by mouth every 6 (six) hours as needed for Pain. 20 tablet 0    promethazine (PHENERGAN) 25 MG tablet Take 1 tablet (25 mg total) by mouth every 6 (six) hours as needed for Nausea. 15 tablet 0    tamsulosin (FLOMAX) 0.4 mg Cap Take 1 capsule (0.4 mg total) by mouth once daily. 10 capsule 0    topiramate (TOPAMAX) 50 MG tablet Take 1 tablet (50 mg total) by mouth 2 (two) times daily. 180 tablet 1    [DISCONTINUED] dextroamphetamine-amphetamine 30 mg Tab Take 1 tablet (30 mg total) by mouth once daily. 30 tablet 0     No current facility-administered medications on file prior to visit.

## 2025-06-04 ENCOUNTER — OFFICE VISIT (OUTPATIENT)
Dept: UROLOGY | Facility: CLINIC | Age: 35
End: 2025-06-04
Payer: OTHER GOVERNMENT

## 2025-06-04 VITALS
HEART RATE: 78 BPM | OXYGEN SATURATION: 99 % | WEIGHT: 167 LBS | SYSTOLIC BLOOD PRESSURE: 127 MMHG | DIASTOLIC BLOOD PRESSURE: 83 MMHG | HEIGHT: 63 IN | BODY MASS INDEX: 29.59 KG/M2

## 2025-06-04 DIAGNOSIS — E83.59 NEPHROCALCINOSIS: Primary | ICD-10-CM

## 2025-06-04 DIAGNOSIS — R31.29 OTHER MICROSCOPIC HEMATURIA: ICD-10-CM

## 2025-06-04 DIAGNOSIS — N20.0 RECURRENT NEPHROLITHIASIS: ICD-10-CM

## 2025-06-04 DIAGNOSIS — N29 NEPHROCALCINOSIS: Primary | ICD-10-CM

## 2025-06-04 PROCEDURE — 99999 PR PBB SHADOW E&M-EST. PATIENT-LVL V: CPT | Mod: PBBFAC,,, | Performed by: UROLOGY

## 2025-06-04 PROCEDURE — 99215 OFFICE O/P EST HI 40 MIN: CPT | Mod: PBBFAC | Performed by: UROLOGY

## 2025-06-16 ENCOUNTER — HOSPITAL ENCOUNTER (OUTPATIENT)
Dept: RADIOLOGY | Facility: HOSPITAL | Age: 35
Discharge: HOME | End: 2025-06-16
Attending: UROLOGY
Payer: OTHER GOVERNMENT

## 2025-06-16 DIAGNOSIS — R31.29 OTHER MICROSCOPIC HEMATURIA: ICD-10-CM

## 2025-06-16 DIAGNOSIS — E83.59 NEPHROCALCINOSIS: ICD-10-CM

## 2025-06-16 DIAGNOSIS — N20.0 RECURRENT NEPHROLITHIASIS: ICD-10-CM

## 2025-06-16 DIAGNOSIS — N29 NEPHROCALCINOSIS: ICD-10-CM

## 2025-06-16 PROCEDURE — 25500020 PHARM REV CODE 255: Performed by: UROLOGY

## 2025-06-16 PROCEDURE — 74178 CT ABD&PLV WO CNTR FLWD CNTR: CPT | Mod: 26,,, | Performed by: RADIOLOGY

## 2025-06-16 PROCEDURE — 74178 CT ABD&PLV WO CNTR FLWD CNTR: CPT | Mod: TC

## 2025-06-16 RX ORDER — IOPAMIDOL 755 MG/ML
100 INJECTION, SOLUTION INTRAVASCULAR
Status: COMPLETED | OUTPATIENT
Start: 2025-06-16 | End: 2025-06-16

## 2025-06-16 RX ADMIN — IOPAMIDOL 100 ML: 755 INJECTION, SOLUTION INTRAVENOUS at 08:06

## 2025-06-17 ENCOUNTER — OFFICE VISIT (OUTPATIENT)
Dept: CARDIOLOGY | Facility: CLINIC | Age: 35
End: 2025-06-17
Payer: OTHER GOVERNMENT

## 2025-06-17 VITALS
BODY MASS INDEX: 30.3 KG/M2 | WEIGHT: 171 LBS | HEIGHT: 63 IN | DIASTOLIC BLOOD PRESSURE: 64 MMHG | OXYGEN SATURATION: 100 % | SYSTOLIC BLOOD PRESSURE: 118 MMHG | HEART RATE: 98 BPM

## 2025-06-17 DIAGNOSIS — M79.89 LEG SWELLING: ICD-10-CM

## 2025-06-17 DIAGNOSIS — R06.02 SOB (SHORTNESS OF BREATH) ON EXERTION: Primary | ICD-10-CM

## 2025-06-17 DIAGNOSIS — R00.2 PALPITATIONS: ICD-10-CM

## 2025-06-17 PROCEDURE — 99214 OFFICE O/P EST MOD 30 MIN: CPT | Mod: S$PBB,,, | Performed by: NURSE PRACTITIONER

## 2025-06-17 PROCEDURE — 99214 OFFICE O/P EST MOD 30 MIN: CPT | Mod: PBBFAC | Performed by: NURSE PRACTITIONER

## 2025-06-17 PROCEDURE — 99999 PR PBB SHADOW E&M-EST. PATIENT-LVL IV: CPT | Mod: PBBFAC,,, | Performed by: NURSE PRACTITIONER

## 2025-06-17 NOTE — PROGRESS NOTES
"PCP: Sanjay Walton MD    Referring Provider:     Subjective:   Lauren Mitchell is a 35 y.o. female with hx of palpitations and ADHD who presents for follow up.    6/17/25 - Echo and Zio cardiac monitor were grossly normal. Pt still with same c/o SOB on exertion and palpitations on exertion. Still has c/o swelling in her hands and feet in the mornings, improves throughout the day. She denies any chest pain, neck or jaw pain or any other cardiac symptoms.     3/11/25 - Pt has c/o SOB on exertion, palpitations on exertion and swelling in her hands and feet in the mornings. She states she is "out of breath after climbing a flight of stairs." She denies any chest pain, neck or jaw pain, diaphoresis, orthopnea or PND. Echo in 2022 was normal. EKG today shows sinus rhythm with HR 98bpm. She states she has noticed her HR now stays around 100bpm. Pt takes Adderall for ADHD and has been on this medication for the last 10-12 years.         Fhx: no premature CAD - Father has DM2  Shx: Never smoker, occasional ETOH use, no illicit drug use    EKG   Results for orders placed or performed in visit on 03/11/25   EKG 12-lead    Collection Time: 03/11/25  8:57 AM   Result Value Ref Range    QRS Duration 82 ms    [facility] QTC Calculation 423 ms    Narrative    Test Reason : R00.2,    Vent. Rate :  98 BPM     Atrial Rate :  98 BPM     P-R Int : 150 ms          QRS Dur :  82 ms      QT Int : 332 ms       P-R-T Axes :  77  92  77 degrees    QTcB Int : 423 ms    Normal sinus rhythm  Rightward axis  Septal infarct ,age undetermined  Abnormal ECG  When compared with ECG of 12-Mar-2024 15:05,  Septal infarct is now Present  Confirmed by Jacek Spivey ([id number]) on 3/21/2025 5:47:43 PM    Referred By:            Confirmed By: Jacek Spivey     ECHO Results for orders placed during the hospital encounter of 01/11/22    Echo    Interpretation Summary  · The left ventricle is normal in size with normal systolic function.  · The estimated ejection " "fraction is 55%.  · Normal left ventricular diastolic function.  · Atrial fibrillation not observed.  · Normal right ventricular size.  · Mild tricuspid regurgitation.  · Normal central venous pressure (3 mmHg).  · The estimated PA systolic pressure is 32 mmHg.    [facility] No results found for this or any previous visit.        Lab Results   Component Value Date     06/04/2025    K 4.1 06/04/2025     06/04/2025    CO2 27 06/04/2025    BUN 10 06/04/2025    CREATININE 0.73 06/04/2025    CALCIUM 9.4 06/04/2025    ANIONGAP 9 06/04/2025       Lab Results   Component Value Date    CHOL 137 10/04/2023     Lab Results   Component Value Date    HDL 48 10/04/2023     Lab Results   Component Value Date    LDLCALC 76 10/04/2023     Lab Results   Component Value Date    TRIG 63 10/04/2023     Lab Results   Component Value Date    CHOLHDL 2.9 10/04/2023       Lab Results   Component Value Date    WBC 13.61 (H) 02/19/2025    HGB 14.3 02/19/2025    HCT 42.6 02/19/2025    MCV 84.4 02/19/2025     02/19/2025         Current Medications[1]    Review of Systems   Constitutional:  Negative for chills, diaphoresis, fever and malaise/fatigue.   Respiratory:  Positive for shortness of breath. Negative for cough.    Cardiovascular:  Positive for palpitations and leg swelling. Negative for chest pain, orthopnea and PND.   Gastrointestinal:  Negative for abdominal pain, nausea and vomiting.   Musculoskeletal:  Negative for falls.   Neurological:  Negative for focal weakness and weakness.         Objective:   /64 (BP Location: Left arm, Patient Position: Sitting)   Pulse 98   Ht 5' 3" (1.6 m)   Wt 77.6 kg (171 lb)   SpO2 100%   BMI 30.29 kg/m²     Physical Exam  Constitutional:       General: She is not in acute distress.     Appearance: Normal appearance.   Cardiovascular:      Rate and Rhythm: Normal rate and regular rhythm.      Pulses: Normal pulses.      Heart sounds: Normal heart sounds.   Pulmonary:      Effort: " Pulmonary effort is normal.      Breath sounds: Normal breath sounds.   Musculoskeletal:      Cervical back: Neck supple. No rigidity.      Right lower leg: No edema.      Left lower leg: No edema.   Skin:     General: Skin is warm and dry.   Neurological:      Mental Status: She is alert.           Assessment:     1. SOB (shortness of breath) on exertion  Exercise Stress - EKG      2. Palpitations  Exercise Stress - EKG      3. Leg swelling                Plan:   Palpitations  C/o palpitations with exertion  HR 90s-100  ZIO cardiac monitor is normal  TSH was normal    SOB (shortness of breath) on exertion  Also with c/o legs and hand swelling in the mornings  Denies orthopnea or PND  Labs and chest xray were normal including TSH  Echo grossly normal  Exercise stress test as outpatient to rule out cardiac etiology    Leg swelling  No edema on exam  Has c/o legs and hands swelling in the mornings   Denies orthopnea or PND  Echo grossly normal      We will call with the exercise stress test results and determine follow up after those results are available           [1]   Current Outpatient Medications:     cetirizine-pseudoephedrine 5-120 mg Tb12, Take 1 tablet by mouth once daily., Disp: , Rfl:     dextroamphetamine-amphetamine 30 mg Tab, Take 1 tablet (30 mg total) by mouth once daily., Disp: 30 tablet, Rfl: 0    [START ON 6/20/2025] dextroamphetamine-amphetamine 30 mg Tab, Take 1 tablet (30 mg total) by mouth once daily., Disp: 30 tablet, Rfl: 0    [START ON 7/20/2025] dextroamphetamine-amphetamine 30 mg Tab, Take 1 tablet (30 mg total) by mouth once daily., Disp: 30 tablet, Rfl: 0    ibuprofen (ADVIL,MOTRIN) 800 MG tablet, Take 1 tablet (800 mg total) by mouth every 6 (six) hours as needed for Pain., Disp: 20 tablet, Rfl: 0    promethazine (PHENERGAN) 25 MG tablet, Take 1 tablet (25 mg total) by mouth every 6 (six) hours as needed for Nausea., Disp: 15 tablet, Rfl: 0    tamsulosin (FLOMAX) 0.4 mg Cap, Take 1  capsule (0.4 mg total) by mouth once daily., Disp: 10 capsule, Rfl: 0    topiramate (TOPAMAX) 50 MG tablet, Take 1 tablet (50 mg total) by mouth 2 (two) times daily., Disp: 180 tablet, Rfl: 1

## 2025-06-17 NOTE — ASSESSMENT & PLAN NOTE
No edema on exam  Has c/o legs and hands swelling in the mornings   Denies orthopnea or PND  Echo grossly normal

## 2025-06-17 NOTE — PATIENT INSTRUCTIONS
Exercise stress test as outpatient  We will call you with the results  We will call you with follow up

## 2025-06-17 NOTE — ASSESSMENT & PLAN NOTE
Also with c/o legs and hand swelling in the mornings  Denies orthopnea or PND  Labs and chest xray were normal including TSH  Echo grossly normal  Exercise stress test as outpatient to rule out cardiac etiology

## 2025-06-27 ENCOUNTER — TELEPHONE (OUTPATIENT)
Dept: CARDIOLOGY | Facility: HOSPITAL | Age: 35
End: 2025-06-27
Payer: OTHER GOVERNMENT

## 2025-06-30 ENCOUNTER — HOSPITAL ENCOUNTER (OUTPATIENT)
Dept: CARDIOLOGY | Facility: HOSPITAL | Age: 35
Discharge: HOME | End: 2025-06-30
Attending: NURSE PRACTITIONER
Payer: OTHER GOVERNMENT

## 2025-06-30 ENCOUNTER — TELEPHONE (OUTPATIENT)
Dept: UROLOGY | Facility: CLINIC | Age: 35
End: 2025-06-30
Payer: OTHER GOVERNMENT

## 2025-06-30 ENCOUNTER — RESULTS FOLLOW-UP (OUTPATIENT)
Dept: CARDIOLOGY | Facility: CLINIC | Age: 35
End: 2025-06-30

## 2025-06-30 DIAGNOSIS — R06.02 SOB (SHORTNESS OF BREATH) ON EXERTION: ICD-10-CM

## 2025-06-30 DIAGNOSIS — R00.2 PALPITATIONS: ICD-10-CM

## 2025-06-30 LAB
CV STRESS BASE HR: 96 BPM
DIASTOLIC BLOOD PRESSURE: 75 MMHG
OHS CV CPX 1 MINUTE RECOVERY HEART RATE: 179 BPM
OHS CV CPX 85 PERCENT MAX PREDICTED HEART RATE MALE: 158
OHS CV CPX ESTIMATED METS: 10.1
OHS CV CPX MAX PREDICTED HEART RATE: 186
OHS CV CPX PATIENT IS FEMALE: 1
OHS CV CPX PATIENT IS MALE: 0
OHS CV CPX PEAK DIASTOLIC BLOOD PRESSURE: 61 MMHG
OHS CV CPX PEAK HEAR RATE: 193 BPM
OHS CV CPX PEAK RATE PRESSURE PRODUCT: NORMAL
OHS CV CPX PEAK SYSTOLIC BLOOD PRESSURE: 158 MMHG
OHS CV CPX PERCENT MAX PREDICTED HEART RATE ACHIEVED: 110
OHS CV CPX RATE PRESSURE PRODUCT PRESENTING: NORMAL
STRESS ECHO POST EXERCISE DUR MIN: 7 MINUTES
STRESS ECHO POST EXERCISE DUR SEC: 0 SECONDS
SYSTOLIC BLOOD PRESSURE: 130 MMHG

## 2025-06-30 PROCEDURE — 93018 CV STRESS TEST I&R ONLY: CPT | Mod: ,,, | Performed by: HOSPITALIST

## 2025-06-30 PROCEDURE — 93017 CV STRESS TEST TRACING ONLY: CPT

## 2025-06-30 PROCEDURE — 93016 CV STRESS TEST SUPVJ ONLY: CPT | Mod: ,,, | Performed by: HOSPITALIST

## 2025-06-30 NOTE — TELEPHONE ENCOUNTER
Patient called to get the results of her CT.  Spoke with Dr. Ghotra and he would like to discuss it with her when he returns to the office.  Left message that I would let her know on 7/1/25 if he would like for her to come in or if he will give her a call.

## 2025-07-01 ENCOUNTER — OFFICE VISIT (OUTPATIENT)
Dept: UROLOGY | Facility: CLINIC | Age: 35
End: 2025-07-01
Payer: OTHER GOVERNMENT

## 2025-07-01 VITALS
DIASTOLIC BLOOD PRESSURE: 76 MMHG | OXYGEN SATURATION: 96 % | HEIGHT: 63 IN | SYSTOLIC BLOOD PRESSURE: 113 MMHG | BODY MASS INDEX: 30.3 KG/M2 | WEIGHT: 171 LBS | HEART RATE: 89 BPM

## 2025-07-01 DIAGNOSIS — E83.59 NEPHROCALCINOSIS: ICD-10-CM

## 2025-07-01 DIAGNOSIS — N29 NEPHROCALCINOSIS: ICD-10-CM

## 2025-07-01 DIAGNOSIS — N20.0 RECURRENT NEPHROLITHIASIS: Primary | ICD-10-CM

## 2025-07-01 PROCEDURE — 99214 OFFICE O/P EST MOD 30 MIN: CPT | Mod: PBBFAC | Performed by: UROLOGY

## 2025-07-01 PROCEDURE — 99999 PR PBB SHADOW E&M-EST. PATIENT-LVL IV: CPT | Mod: PBBFAC,,, | Performed by: UROLOGY

## 2025-07-02 ENCOUNTER — TELEPHONE (OUTPATIENT)
Dept: CARDIOLOGY | Facility: CLINIC | Age: 35
End: 2025-07-02
Payer: OTHER GOVERNMENT

## 2025-07-02 NOTE — PROGRESS NOTES
Assessment:   1. Recurrent nephrolithiasis    2. Nephrocalcinosis         Plan:     We reviewed her CT scan and discussed the renal cyst as well as the calcification seen in the parenchyma and discussed that now is the time to optimize diet and reduce intake of salt increase intake of water reduce intake of animal protein and more fruits and vegetables with with liberal use of citrus.           Gareth Ghotra MD  Urology  07/02/2025          UROLOGY HISTORY AND PHYSICAL EXAM    Subjective:      Patient ID: Lauren Mitchell is a 35 y.o. female.    Chief Complaint::   Follow-up    The patient is a 35-year-old female with a history of recurrent nephrolithiasis that presents today in follow-up.  She is not having any dysuria hematuria or flank pain.     Past Medical History:   Diagnosis Date    ADHD (attention deficit hyperactivity disorder)      History reviewed. No pertinent surgical history.     Medications Ordered Prior to Encounter[1]     Review of patient's allergies indicates:   Allergen Reactions    Bupropion Other (See Comments)     Made depression worse    Doxycycline Other (See Comments)     Palms/soles itchy, stomach cramps/diarrhea    Latex Other (See Comments)    Penicillins Rash     Vitals:    07/01/25 1350   BP: 113/76   Pulse: 89          Review of Systems   Constitutional:  Negative for activity change.   Genitourinary:  Negative for dysuria, flank pain and hematuria.      Objective:     Physical Exam  Vitals and nursing note reviewed.   Constitutional:       General: She is not in acute distress.     Appearance: She is well-developed. She is not diaphoretic.   HENT:      Head: Normocephalic.   Eyes:      Conjunctiva/sclera: Conjunctivae normal.   Cardiovascular:      Rate and Rhythm: Normal rate.   Pulmonary:      Effort: Pulmonary effort is normal. No respiratory distress.      Breath sounds: No wheezing.   Abdominal:      General: There is no distension.      Tenderness: There is no abdominal  tenderness.   Skin:     General: Skin is warm.   Neurological:      Mental Status: She is alert and oriented to person, place, and time.      Motor: No abnormal muscle tone.   Psychiatric:         Attention and Perception: Attention normal.         Mood and Affect: Affect normal.         Speech: Speech normal.         Behavior: Behavior normal.         Thought Content: Thought content normal.         Cognition and Memory: Cognition and memory normal.         Judgment: Judgment normal.            CMP  Sodium   Date Value Ref Range Status   06/04/2025 139 136 - 145 mmol/L Final     Potassium   Date Value Ref Range Status   06/04/2025 4.1 3.5 - 5.1 mmol/L Final     Chloride   Date Value Ref Range Status   06/04/2025 107 98 - 107 mmol/L Final     CO2   Date Value Ref Range Status   06/04/2025 27 22 - 29 mmol/L Final     Glucose   Date Value Ref Range Status   06/04/2025 94 74 - 100 mg/dL Final     BUN   Date Value Ref Range Status   06/04/2025 10 7 - 19 mg/dL Final     Creatinine   Date Value Ref Range Status   06/04/2025 0.73 0.55 - 1.02 mg/dL Final     Calcium   Date Value Ref Range Status   06/04/2025 9.4 8.4 - 10.2 mg/dL Final     Total Protein   Date Value Ref Range Status   02/19/2025 6.8 6.4 - 8.3 g/dL Final     Albumin   Date Value Ref Range Status   02/19/2025 4.0 3.5 - 5.0 g/dL Final     Bilirubin, Total   Date Value Ref Range Status   02/19/2025 0.9 <=1.5 mg/dL Final     Alk Phos   Date Value Ref Range Status   02/19/2025 54 40 - 150 U/L Final     AST   Date Value Ref Range Status   02/19/2025 16 5 - 34 U/L Final     ALT   Date Value Ref Range Status   02/19/2025 19 <=55 U/L Final     Anion Gap   Date Value Ref Range Status   06/04/2025 9 7 - 16 mmol/L Final     eGFR   Date Value Ref Range Status   06/04/2025 111 >=60 mL/min/1.73m2 Final     Comment:     Estimated GFR calculated using the CKD-EPI creatinine (2021) equation.      BMP  Lab Results   Component Value Date     06/04/2025    K 4.1 06/04/2025      06/04/2025    CO2 27 06/04/2025    BUN 10 06/04/2025    CREATININE 0.73 06/04/2025    CALCIUM 9.4 06/04/2025    ANIONGAP 9 06/04/2025    EGFRNORACEVR 111 06/04/2025                [1]  Current Outpatient Medications on File Prior to Visit   Medication Sig Dispense Refill    cetirizine-pseudoephedrine 5-120 mg Tb12 Take 1 tablet by mouth once daily.      dextroamphetamine-amphetamine 30 mg Tab Take 1 tablet (30 mg total) by mouth once daily. 30 tablet 0    [START ON 7/20/2025] dextroamphetamine-amphetamine 30 mg Tab Take 1 tablet (30 mg total) by mouth once daily. 30 tablet 0    ibuprofen (ADVIL,MOTRIN) 800 MG tablet Take 1 tablet (800 mg total) by mouth every 6 (six) hours as needed for Pain. 20 tablet 0    promethazine (PHENERGAN) 25 MG tablet Take 1 tablet (25 mg total) by mouth every 6 (six) hours as needed for Nausea. 15 tablet 0    tamsulosin (FLOMAX) 0.4 mg Cap Take 1 capsule (0.4 mg total) by mouth once daily. 10 capsule 0    topiramate (TOPAMAX) 50 MG tablet Take 1 tablet (50 mg total) by mouth 2 (two) times daily. 180 tablet 1     No current facility-administered medications on file prior to visit.

## 2025-07-02 NOTE — TELEPHONE ENCOUNTER
Called patient with normal stress test results     ----- Message from JEFF Rocha sent at 6/30/2025  4:39 PM CDT -----  Please call and let her know that her stress test is normal  ----- Message -----  From: Jacek Spivey MD  Sent: 6/30/2025   4:10 PM CDT  To: JEFF Lerma

## 2025-08-15 ENCOUNTER — OFFICE VISIT (OUTPATIENT)
Dept: FAMILY MEDICINE | Facility: CLINIC | Age: 35
End: 2025-08-15
Payer: OTHER GOVERNMENT

## 2025-08-15 VITALS
WEIGHT: 160.81 LBS | TEMPERATURE: 98 F | RESPIRATION RATE: 18 BRPM | DIASTOLIC BLOOD PRESSURE: 84 MMHG | HEIGHT: 63 IN | OXYGEN SATURATION: 99 % | SYSTOLIC BLOOD PRESSURE: 112 MMHG | HEART RATE: 89 BPM | BODY MASS INDEX: 28.49 KG/M2

## 2025-08-15 DIAGNOSIS — M79.672 LEFT FOOT PAIN: Primary | ICD-10-CM

## 2025-08-15 DIAGNOSIS — Z79.899 HIGH RISK MEDICATION USE: ICD-10-CM

## 2025-08-15 DIAGNOSIS — M25.572 LEFT ANKLE PAIN, UNSPECIFIED CHRONICITY: ICD-10-CM

## 2025-08-15 LAB
B-HCG UR QL: NEGATIVE
CTP QC/QA: YES

## 2025-08-15 RX ORDER — DICLOFENAC SODIUM 10 MG/G
GEL TOPICAL
Qty: 100 G | Refills: 2 | Status: SHIPPED | OUTPATIENT
Start: 2025-08-15

## 2025-08-18 ENCOUNTER — TELEPHONE (OUTPATIENT)
Dept: FAMILY MEDICINE | Facility: CLINIC | Age: 35
End: 2025-08-18
Payer: OTHER GOVERNMENT

## 2025-08-21 ENCOUNTER — OFFICE VISIT (OUTPATIENT)
Dept: FAMILY MEDICINE | Facility: CLINIC | Age: 35
End: 2025-08-21
Payer: OTHER GOVERNMENT

## 2025-08-21 VITALS
WEIGHT: 159 LBS | HEART RATE: 99 BPM | TEMPERATURE: 98 F | HEIGHT: 63 IN | RESPIRATION RATE: 18 BRPM | DIASTOLIC BLOOD PRESSURE: 78 MMHG | SYSTOLIC BLOOD PRESSURE: 112 MMHG | OXYGEN SATURATION: 99 % | BODY MASS INDEX: 28.17 KG/M2

## 2025-08-21 DIAGNOSIS — J02.9 SORE THROAT: ICD-10-CM

## 2025-08-21 DIAGNOSIS — Z79.899 LONG-TERM USE OF HIGH-RISK MEDICATION: ICD-10-CM

## 2025-08-21 DIAGNOSIS — U07.1 COVID: ICD-10-CM

## 2025-08-21 DIAGNOSIS — F98.8 ADULT ATTENTION DEFICIT DISORDER: Primary | ICD-10-CM

## 2025-08-21 DIAGNOSIS — Z11.52 ENCOUNTER FOR SCREENING FOR COVID-19: ICD-10-CM

## 2025-08-21 LAB
CTP QC/QA: YES
MOLECULAR STREP A: NEGATIVE
POC (AMP) AMPHETAMINE: ABNORMAL
POC (BAR) BARBITURATES: NEGATIVE
POC (BUP) BUPRENORPHINE: NEGATIVE
POC (BZO) BENZODIAZEPINES: NEGATIVE
POC (COC) COCAINE: NEGATIVE
POC (MDMA) METHYLENEDIOXYMETHAMPHETAMINE 3,4: NEGATIVE
POC (MET) METHAMPHETAMINE: NEGATIVE
POC (MOP) OPIATES: NEGATIVE
POC (MTD) METHADONE: NEGATIVE
POC (OXY) OXYCODONE: NEGATIVE
POC (PCP) PHENCYCLIDINE: NEGATIVE
POC (TCA) TRICYCLIC ANTIDEPRESSANTS: NEGATIVE
POC TEMPERATURE (URINE): 92
POC THC: NEGATIVE
SARS-COV-2 RDRP RESP QL NAA+PROBE: POSITIVE

## 2025-08-21 PROCEDURE — 99214 OFFICE O/P EST MOD 30 MIN: CPT | Mod: ,,, | Performed by: FAMILY MEDICINE

## 2025-08-21 PROCEDURE — 80305 DRUG TEST PRSMV DIR OPT OBS: CPT | Mod: QW,,, | Performed by: FAMILY MEDICINE

## 2025-08-21 PROCEDURE — 87635 SARS-COV-2 COVID-19 AMP PRB: CPT | Mod: QW,,, | Performed by: FAMILY MEDICINE

## 2025-08-21 PROCEDURE — 87651 STREP A DNA AMP PROBE: CPT | Mod: QW,,, | Performed by: FAMILY MEDICINE

## 2025-08-21 RX ORDER — FLUTICASONE PROPIONATE 50 MCG
2 SPRAY, SUSPENSION (ML) NASAL DAILY
Qty: 16 G | Refills: 2 | Status: SHIPPED | OUTPATIENT
Start: 2025-08-21

## 2025-08-21 RX ORDER — PROMETHAZINE HYDROCHLORIDE AND DEXTROMETHORPHAN HYDROBROMIDE 6.25; 15 MG/5ML; MG/5ML
5 SYRUP ORAL NIGHTLY PRN
Qty: 240 ML | Refills: 0 | Status: SHIPPED | OUTPATIENT
Start: 2025-08-21 | End: 2025-08-31

## 2025-08-21 RX ORDER — ALBUTEROL SULFATE 90 UG/1
2 INHALANT RESPIRATORY (INHALATION) EVERY 6 HOURS PRN
Qty: 18 G | Refills: 0 | Status: SHIPPED | OUTPATIENT
Start: 2025-08-21 | End: 2026-08-21

## 2025-08-21 RX ORDER — DEXTROAMPHETAMINE SACCHARATE, AMPHETAMINE ASPARTATE, DEXTROAMPHETAMINE SULFATE AND AMPHETAMINE SULFATE 7.5; 7.5; 7.5; 7.5 MG/1; MG/1; MG/1; MG/1
1 TABLET ORAL DAILY
Qty: 30 TABLET | Refills: 0 | Status: SHIPPED | OUTPATIENT
Start: 2025-08-21 | End: 2025-09-20

## 2025-08-21 RX ORDER — DEXTROAMPHETAMINE SACCHARATE, AMPHETAMINE ASPARTATE, DEXTROAMPHETAMINE SULFATE AND AMPHETAMINE SULFATE 7.5; 7.5; 7.5; 7.5 MG/1; MG/1; MG/1; MG/1
1 TABLET ORAL DAILY
Qty: 30 TABLET | Refills: 0 | Status: SHIPPED | OUTPATIENT
Start: 2025-10-20 | End: 2025-11-19

## 2025-08-21 RX ORDER — DEXTROAMPHETAMINE SACCHARATE, AMPHETAMINE ASPARTATE, DEXTROAMPHETAMINE SULFATE AND AMPHETAMINE SULFATE 7.5; 7.5; 7.5; 7.5 MG/1; MG/1; MG/1; MG/1
1 TABLET ORAL DAILY
Qty: 30 TABLET | Refills: 0 | Status: SHIPPED | OUTPATIENT
Start: 2025-09-20 | End: 2025-10-20